# Patient Record
Sex: MALE | Race: BLACK OR AFRICAN AMERICAN | NOT HISPANIC OR LATINO | URBAN - METROPOLITAN AREA
[De-identification: names, ages, dates, MRNs, and addresses within clinical notes are randomized per-mention and may not be internally consistent; named-entity substitution may affect disease eponyms.]

---

## 2023-03-28 LAB
ANION GAP IN SER/PLAS: 11 MMOL/L (ref 10–20)
APPEARANCE, URINE: CLEAR
BILIRUBIN, URINE: NEGATIVE
BLOOD, URINE: NEGATIVE
CALCIDIOL (25 OH VITAMIN D3) (NG/ML) IN SER/PLAS: 20 NG/ML
CALCIUM (MG/DL) IN SER/PLAS: 9.6 MG/DL (ref 8.6–10.3)
CARBON DIOXIDE, TOTAL (MMOL/L) IN SER/PLAS: 31 MMOL/L (ref 21–32)
CHLORIDE (MMOL/L) IN SER/PLAS: 99 MMOL/L (ref 98–107)
COLOR, URINE: YELLOW
CREATININE (MG/DL) IN SER/PLAS: 1.63 MG/DL (ref 0.5–1.3)
CREATININE (MG/DL) IN URINE: 204 MG/DL (ref 20–370)
GFR MALE: 50 ML/MIN/1.73M2
GLUCOSE (MG/DL) IN SER/PLAS: 118 MG/DL (ref 74–99)
GLUCOSE, URINE: NEGATIVE MG/DL
KETONES, URINE: NEGATIVE MG/DL
LEUKOCYTE ESTERASE, URINE: NEGATIVE
NITRITE, URINE: NEGATIVE
PARATHYRIN INTACT (PG/ML) IN SER/PLAS: 61.4 PG/ML (ref 18.5–88)
PH, URINE: 5 (ref 5–8)
PHOSPHATE (MG/DL) IN SER/PLAS: 4 MG/DL (ref 2.5–4.9)
POTASSIUM (MMOL/L) IN SER/PLAS: 3.3 MMOL/L (ref 3.5–5.3)
PROTEIN (MG/DL) IN URINE: 8 MG/DL (ref 5–25)
PROTEIN, URINE: NEGATIVE MG/DL
PROTEIN/CREATININE (MG/MG) IN URINE: 0.04 MG/MG CREAT (ref 0–0.17)
SODIUM (MMOL/L) IN SER/PLAS: 138 MMOL/L (ref 136–145)
SPECIFIC GRAVITY, URINE: 1.02 (ref 1–1.03)
UREA NITROGEN (MG/DL) IN SER/PLAS: 15 MG/DL (ref 6–23)
UROBILINOGEN, URINE: <2 MG/DL (ref 0–1.9)

## 2023-07-27 ENCOUNTER — OFFICE VISIT (OUTPATIENT)
Dept: PRIMARY CARE | Facility: CLINIC | Age: 53
End: 2023-07-27
Payer: COMMERCIAL

## 2023-07-27 VITALS
WEIGHT: 282.4 LBS | DIASTOLIC BLOOD PRESSURE: 92 MMHG | BODY MASS INDEX: 39.39 KG/M2 | OXYGEN SATURATION: 98 % | TEMPERATURE: 97.8 F | HEART RATE: 66 BPM | SYSTOLIC BLOOD PRESSURE: 148 MMHG

## 2023-07-27 DIAGNOSIS — I10 PRIMARY HYPERTENSION: ICD-10-CM

## 2023-07-27 DIAGNOSIS — M62.830 BACK MUSCLE SPASM: ICD-10-CM

## 2023-07-27 DIAGNOSIS — E11.59 TYPE 2 DIABETES MELLITUS WITH OTHER CIRCULATORY COMPLICATION, WITHOUT LONG-TERM CURRENT USE OF INSULIN (MULTI): ICD-10-CM

## 2023-07-27 DIAGNOSIS — Z00.00 ANNUAL PHYSICAL EXAM: Primary | ICD-10-CM

## 2023-07-27 PROBLEM — E11.9 DM2 (DIABETES MELLITUS, TYPE 2) (MULTI): Status: ACTIVE | Noted: 2023-07-27

## 2023-07-27 LAB
ALANINE AMINOTRANSFERASE (SGPT) (U/L) IN SER/PLAS: 28 U/L (ref 10–52)
ALBUMIN (G/DL) IN SER/PLAS: 4.1 G/DL (ref 3.4–5)
ALKALINE PHOSPHATASE (U/L) IN SER/PLAS: 66 U/L (ref 33–120)
ANION GAP IN SER/PLAS: 12 MMOL/L (ref 10–20)
ASPARTATE AMINOTRANSFERASE (SGOT) (U/L) IN SER/PLAS: 23 U/L (ref 9–39)
BILIRUBIN TOTAL (MG/DL) IN SER/PLAS: 0.9 MG/DL (ref 0–1.2)
CALCIUM (MG/DL) IN SER/PLAS: 9.5 MG/DL (ref 8.6–10.6)
CARBON DIOXIDE, TOTAL (MMOL/L) IN SER/PLAS: 30 MMOL/L (ref 21–32)
CHLORIDE (MMOL/L) IN SER/PLAS: 102 MMOL/L (ref 98–107)
CHOLESTEROL (MG/DL) IN SER/PLAS: 151 MG/DL (ref 0–199)
CHOLESTEROL IN HDL (MG/DL) IN SER/PLAS: 53.3 MG/DL
CHOLESTEROL/HDL RATIO: 2.8
CREATININE (MG/DL) IN SER/PLAS: 1.41 MG/DL (ref 0.5–1.3)
ERYTHROCYTE DISTRIBUTION WIDTH (RATIO) BY AUTOMATED COUNT: 14.9 % (ref 11.5–14.5)
ERYTHROCYTE MEAN CORPUSCULAR HEMOGLOBIN CONCENTRATION (G/DL) BY AUTOMATED: 30.8 G/DL (ref 32–36)
ERYTHROCYTE MEAN CORPUSCULAR VOLUME (FL) BY AUTOMATED COUNT: 83 FL (ref 80–100)
ERYTHROCYTES (10*6/UL) IN BLOOD BY AUTOMATED COUNT: 5.24 X10E12/L (ref 4.5–5.9)
ESTIMATED AVERAGE GLUCOSE FOR HBA1C: 151 MG/DL
GFR MALE: 59 ML/MIN/1.73M2
GLUCOSE (MG/DL) IN SER/PLAS: 104 MG/DL (ref 74–99)
HEMATOCRIT (%) IN BLOOD BY AUTOMATED COUNT: 43.5 % (ref 41–52)
HEMOGLOBIN (G/DL) IN BLOOD: 13.4 G/DL (ref 13.5–17.5)
HEMOGLOBIN A1C/HEMOGLOBIN TOTAL IN BLOOD: 6.9 %
LDL: 74 MG/DL (ref 0–99)
LEUKOCYTES (10*3/UL) IN BLOOD BY AUTOMATED COUNT: 6.1 X10E9/L (ref 4.4–11.3)
NRBC (PER 100 WBCS) BY AUTOMATED COUNT: 0 /100 WBC (ref 0–0)
PLATELETS (10*3/UL) IN BLOOD AUTOMATED COUNT: 177 X10E9/L (ref 150–450)
POTASSIUM (MMOL/L) IN SER/PLAS: 3.4 MMOL/L (ref 3.5–5.3)
PROTEIN TOTAL: 7.1 G/DL (ref 6.4–8.2)
SODIUM (MMOL/L) IN SER/PLAS: 141 MMOL/L (ref 136–145)
THYROTROPIN (MIU/L) IN SER/PLAS BY DETECTION LIMIT <= 0.05 MIU/L: 1.7 MIU/L (ref 0.44–3.98)
TRIGLYCERIDE (MG/DL) IN SER/PLAS: 118 MG/DL (ref 0–149)
UREA NITROGEN (MG/DL) IN SER/PLAS: 14 MG/DL (ref 6–23)
VLDL: 24 MG/DL (ref 0–40)

## 2023-07-27 PROCEDURE — 85027 COMPLETE CBC AUTOMATED: CPT

## 2023-07-27 PROCEDURE — 80061 LIPID PANEL: CPT

## 2023-07-27 PROCEDURE — 1036F TOBACCO NON-USER: CPT | Performed by: NURSE PRACTITIONER

## 2023-07-27 PROCEDURE — 3066F NEPHROPATHY DOC TX: CPT | Performed by: NURSE PRACTITIONER

## 2023-07-27 PROCEDURE — 83036 HEMOGLOBIN GLYCOSYLATED A1C: CPT

## 2023-07-27 PROCEDURE — 3077F SYST BP >= 140 MM HG: CPT | Performed by: NURSE PRACTITIONER

## 2023-07-27 PROCEDURE — 99396 PREV VISIT EST AGE 40-64: CPT | Performed by: NURSE PRACTITIONER

## 2023-07-27 PROCEDURE — 80053 COMPREHEN METABOLIC PANEL: CPT

## 2023-07-27 PROCEDURE — 3008F BODY MASS INDEX DOCD: CPT | Performed by: NURSE PRACTITIONER

## 2023-07-27 PROCEDURE — 3080F DIAST BP >= 90 MM HG: CPT | Performed by: NURSE PRACTITIONER

## 2023-07-27 PROCEDURE — 84443 ASSAY THYROID STIM HORMONE: CPT

## 2023-07-27 PROCEDURE — 4010F ACE/ARB THERAPY RXD/TAKEN: CPT | Performed by: NURSE PRACTITIONER

## 2023-07-27 RX ORDER — LOSARTAN POTASSIUM 50 MG/1
50 TABLET ORAL DAILY
COMMUNITY
Start: 2023-05-16 | End: 2024-02-26 | Stop reason: SDUPTHER

## 2023-07-27 RX ORDER — CYCLOBENZAPRINE HCL 10 MG
10 TABLET ORAL 3 TIMES DAILY PRN
Qty: 15 TABLET | Refills: 0 | Status: SHIPPED | OUTPATIENT
Start: 2023-07-27

## 2023-07-27 RX ORDER — ERGOCALCIFEROL 1.25 1/1
50000 CAPSULE ORAL
COMMUNITY
Start: 2023-03-29

## 2023-07-27 RX ORDER — CICLOPIROX 80 MG/ML
SOLUTION TOPICAL
COMMUNITY
Start: 2023-02-14

## 2023-07-27 RX ORDER — METFORMIN HYDROCHLORIDE 500 MG/1
500 TABLET, EXTENDED RELEASE ORAL
COMMUNITY
Start: 2023-05-25 | End: 2024-02-26 | Stop reason: SDUPTHER

## 2023-07-27 RX ORDER — ATORVASTATIN CALCIUM 20 MG/1
20 TABLET, FILM COATED ORAL NIGHTLY
COMMUNITY
Start: 2023-05-25

## 2023-07-27 RX ORDER — BECLOMETHASONE DIPROPIONATE HFA 80 UG/1
2 AEROSOL, METERED RESPIRATORY (INHALATION) 2 TIMES DAILY
COMMUNITY
Start: 2023-05-25

## 2023-07-27 RX ORDER — LISINOPRIL 5 MG/1
TABLET ORAL
COMMUNITY
Start: 2022-10-31 | End: 2023-10-17 | Stop reason: ALTCHOICE

## 2023-07-27 RX ORDER — CLOTRIMAZOLE AND BETAMETHASONE DIPROPIONATE 10; .5 MG/ML; MG/ML
LOTION TOPICAL
COMMUNITY
Start: 2023-05-25

## 2023-07-27 RX ORDER — METHOCARBAMOL 500 MG/1
TABLET, FILM COATED ORAL
COMMUNITY
Start: 2023-04-22 | End: 2023-07-27

## 2023-07-27 RX ORDER — ALBUTEROL SULFATE 90 UG/1
2 AEROSOL, METERED RESPIRATORY (INHALATION) EVERY 4 HOURS PRN
COMMUNITY
Start: 2023-05-25

## 2023-07-27 RX ORDER — CALCIUM CITRATE/VITAMIN D3 200MG-6.25
TABLET ORAL
COMMUNITY
Start: 2023-05-25

## 2023-07-27 RX ORDER — POTASSIUM CHLORIDE 20 MEQ/1
20 TABLET, EXTENDED RELEASE ORAL DAILY PRN
COMMUNITY
Start: 2023-07-09

## 2023-07-27 RX ORDER — CHLORTHALIDONE 25 MG/1
1 TABLET ORAL DAILY
COMMUNITY
Start: 2023-05-25

## 2023-07-27 ASSESSMENT — PAIN SCALES - GENERAL: PAINLEVEL: 4

## 2023-07-27 NOTE — PROGRESS NOTES
Subjective   Patient ID: Hadley Carrasco is a 53 y.o. male who presents for right side pain (Pain right below the breast).    HPI  Pleasant 52 y/o male with PMH DM2, HTN here for Annual  PCP Dr Hernadez    Last HGA1C unknown, does not follow a diabetic diet    C/O pain to his right side. Presented to the ED with same in April 21, 2023. Treated for back muscle strain.   Cxray No acute process  CT ABDOMEN/PELVIS  IMPRESSION:  1. Slight RIGHT lower lobe consolidation, possibly volume loss.  Clinical correlation for signs of infection is recommended.   2. There is no evidence of acute appendicitis.    Reports he has not had relief. Pain is sharp, right mid back, non-radiating. Worse when sitting and relaxing, does not know what make sit better. Happens daily  Denies trauma, injury. Reports he had been working out regularly prior to the pain starting, he has been mostly sedentary since.    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    HENT: Negative.     Respiratory: Negative.     Cardiovascular: Negative.    Gastrointestinal: Negative.    Genitourinary: Negative.    Musculoskeletal: HPI  Psychiatric/Behavioral: Negative.     All other systems reviewed and are negative.    .vsVisit Vitals  BP (!) 148/92 (BP Location: Left arm, Patient Position: Sitting, BP Cuff Size: Large adult)   Pulse 66   Temp 36.6 °C (97.8 °F)   Wt 128 kg (282 lb 6.4 oz)   SpO2 98%   BMI 39.39 kg/m²   Smoking Status Never   BSA 2.53 m²         Objective   Physical Exam  Physical Exam  Vitals reviewed.   Constitutional:       General:  active.   HENT:      Head: Normocephalic and atraumatic.   Eyes:      Extraocular Movements: Extraocular movements intact.      Pupils: Pupils are equal, round, and  reactive to light.   Cardiovascular:      Rate and Rhythm: Normal rate and regular rhythm.   Pulmonary:      Effort: Pulmonary effort is normal.      Breath sounds: Normal breath sounds.   Abdominal:      General: Bowel sounds are normal.   Musculoskeletal:         General: Normal range of motion.      Cervical back: Neck supple, no tenderness with deep palpation  Skin:     General: Skin is warm and dry.      Capillary Refill: Capillary refill takes less than 2 seconds.   Neurological:      General: No focal deficit present.      Mental Status:  alert.     Assessment/Plan   Problem List Items Addressed This Visit       Annual physical exam - Primary    Relevant Orders    CBC    Comprehensive Metabolic Panel    TSH with reflex to Free T4 if abnormal    Hemoglobin A1C    Lipid Panel    DM2 (diabetes mellitus, type 2) (CMS/[facility])     HGA1C  Metformin 1000mg bid  Does not follow diabetic diet         Primary hypertension     Stable taking Chlorthalidone, Lisinopril, Losartan  K+ supplement         Back muscle spasm     Stop Robaxin, not working  Daily stretching, get back into exercise         Relevant Medications    cyclobenzaprine (Flexeril) 10 mg tablet    Adult body mass index 39.0-39.9

## 2023-07-27 NOTE — PROGRESS NOTES
Mom is aware and stated that he is NOT doing better, however, there at the Dermatologist office right now.    ----- Message from Fredi Randolph MD sent at 1/24/2018 11:18 PM CST -----  His  culture grew Staph Aureus, sensitive to the antibiotic he is on. Is he doing any better?     Subjective   Patient ID: Hadley Carrasco is a 53 y.o. male who presents for right side pain (Pain right below the breast).    HPI     Review of Systems    Objective   BP (!) 148/92 (BP Location: Left arm, Patient Position: Sitting, BP Cuff Size: Large adult)   Pulse 66   Temp 36.6 °C (97.8 °F)   Wt 128 kg (282 lb 6.4 oz)   SpO2 98%   BMI 39.39 kg/m²     Physical Exam    Assessment/Plan

## 2023-07-27 NOTE — PATIENT INSTRUCTIONS
A prescription was sent to your pharmacy. Your pharmacist can answer any questions or concerns you may have or you may call the office.    Start daily stretches    *Prostate Exam  PSA at age 55 with Urology follow up if elevated    *Colonoscopy    beginning at age 45 and then every 10 years unless abnormal. Those with high risk or family history are recommended to begin at age 40 and follow up every 5 years  If you choose not to have a colonoscopy you can use a stool -DNA test such as Cologuard which will need to be repeated every 3 years. Abnormal tests will require colonoscopy.    *Hepatitis C one time lab ages 18-79  *HIV one time lab ages 15-65     NOF recommends bone density test if:  you are a man age 70 or older  you break a bone after age 50  you are a man age 50-69 with risk factors    Also consider bone density test if:  an X-ray of your spine showing a break or bone loss in your spine  back pain with a possible break in your spine  height loss of ½ inch or more within one year  total height loss of 1½ inches from your original height        IF YOU SMOKE OR HAVE EVER SMOKED  *Low dose Computed Tomography  Age 50 to 80 with a 20 year history and currently smoking or if you have quit within the last 15 years    *Abdominal Aortic Aneurysm Ultrasound  Age 65 to 75 if you have ever smoked        Immunizations  Influenza yearly beginning in October through March  Pneumococcal beginning at age 65  Zoster (Shingles) after age 50  Tetanus every 10 years if high risk

## 2023-09-15 PROBLEM — E66.9 OBESITY: Status: ACTIVE | Noted: 2017-03-03

## 2023-09-15 PROBLEM — N18.31 STAGE 3A CHRONIC KIDNEY DISEASE (MULTI): Status: ACTIVE | Noted: 2021-05-12

## 2023-09-15 PROBLEM — N52.9 ORGANIC ERECTILE DYSFUNCTION: Status: ACTIVE | Noted: 2020-06-17

## 2023-09-15 PROBLEM — R73.03 PRE-DIABETES: Status: ACTIVE | Noted: 2023-09-15

## 2023-09-15 PROBLEM — J45.20 MILD INTERMITTENT ASTHMA, UNCOMPLICATED (HHS-HCC): Status: ACTIVE | Noted: 2021-05-12

## 2023-09-15 PROBLEM — E78.00 ELEVATED SERUM CHOLESTEROL: Status: ACTIVE | Noted: 2023-09-15

## 2023-09-15 PROBLEM — E55.9 VITAMIN D DEFICIENCY: Status: ACTIVE | Noted: 2023-09-15

## 2023-09-15 PROBLEM — G47.33 OSA ON CPAP: Status: ACTIVE | Noted: 2019-10-08

## 2023-09-15 PROBLEM — K43.9 VENTRAL HERNIA: Status: ACTIVE | Noted: 2021-05-14

## 2023-09-15 PROBLEM — R07.81 RIB PAIN ON RIGHT SIDE: Status: ACTIVE | Noted: 2023-09-15

## 2023-09-15 PROBLEM — K42.9 UMBILICAL HERNIA WITHOUT OBSTRUCTION OR GANGRENE: Status: ACTIVE | Noted: 2021-05-12

## 2023-09-15 PROBLEM — E11.9 TYPE 2 DIABETES MELLITUS WITHOUT COMPLICATION, WITHOUT LONG-TERM CURRENT USE OF INSULIN (MULTI): Status: ACTIVE | Noted: 2021-05-11

## 2023-09-15 PROBLEM — E78.49 OTHER HYPERLIPIDEMIA: Status: ACTIVE | Noted: 2019-10-08

## 2023-09-15 PROBLEM — E87.6 HYPOKALEMIA: Status: ACTIVE | Noted: 2023-09-15

## 2023-09-15 PROBLEM — I10 HYPERTENSION: Status: ACTIVE | Noted: 2019-10-08

## 2023-09-15 PROBLEM — R10.11 RUQ PAIN: Status: ACTIVE | Noted: 2023-09-15

## 2023-09-15 RX ORDER — LISINOPRIL 2.5 MG/1
2.5 TABLET ORAL DAILY
COMMUNITY
Start: 2020-01-23 | End: 2023-10-17 | Stop reason: ALTCHOICE

## 2023-09-15 RX ORDER — SILDENAFIL 100 MG/1
100 TABLET, FILM COATED ORAL DAILY PRN
COMMUNITY
Start: 2022-12-06

## 2023-10-17 ENCOUNTER — OFFICE VISIT (OUTPATIENT)
Dept: NEPHROLOGY | Facility: CLINIC | Age: 53
End: 2023-10-17
Payer: COMMERCIAL

## 2023-10-17 VITALS
DIASTOLIC BLOOD PRESSURE: 83 MMHG | BODY MASS INDEX: 39.9 KG/M2 | WEIGHT: 285 LBS | SYSTOLIC BLOOD PRESSURE: 132 MMHG | HEIGHT: 71 IN | HEART RATE: 72 BPM

## 2023-10-17 DIAGNOSIS — I10 ESSENTIAL HYPERTENSION: ICD-10-CM

## 2023-10-17 DIAGNOSIS — N18.31 STAGE 3A CHRONIC KIDNEY DISEASE (MULTI): Primary | ICD-10-CM

## 2023-10-17 PROCEDURE — 1036F TOBACCO NON-USER: CPT | Performed by: INTERNAL MEDICINE

## 2023-10-17 PROCEDURE — 3008F BODY MASS INDEX DOCD: CPT | Performed by: INTERNAL MEDICINE

## 2023-10-17 PROCEDURE — 99213 OFFICE O/P EST LOW 20 MIN: CPT | Performed by: INTERNAL MEDICINE

## 2023-10-17 PROCEDURE — 3079F DIAST BP 80-89 MM HG: CPT | Performed by: INTERNAL MEDICINE

## 2023-10-17 PROCEDURE — 4010F ACE/ARB THERAPY RXD/TAKEN: CPT | Performed by: INTERNAL MEDICINE

## 2023-10-17 PROCEDURE — 3075F SYST BP GE 130 - 139MM HG: CPT | Performed by: INTERNAL MEDICINE

## 2023-10-17 PROCEDURE — 3044F HG A1C LEVEL LT 7.0%: CPT | Performed by: INTERNAL MEDICINE

## 2023-10-17 PROCEDURE — 3066F NEPHROPATHY DOC TX: CPT | Performed by: INTERNAL MEDICINE

## 2023-10-17 NOTE — PROGRESS NOTES
Hadley Carrasco   53 y.o.    @@  [facility]/Room: [id number]/Room/bed info not found    Subjective:   The patient is being seen for a routine clinic follow-up of chronic kidney disease. Recently, the disease has been stable. Disease complications:  No hyperkalemia, no hypocalcemia, no hyperphosphatemia, no metabolic acidosis, no coagulopathy, no uremic encephalopathy, no neuropathy and no renal osteodystrophy. The patient is currently asymptomatic. No associated symptoms are reported.       Meds:   Current Outpatient Medications   Medication Sig Dispense Refill    atorvastatin (Lipitor) 20 mg tablet Take 1 tablet (20 mg) by mouth once daily at bedtime.      chlorthalidone (Hygroton) 25 mg tablet Take 1 tablet (25 mg) by mouth once daily.      ciclopirox (Penlac) 8 % solution Brush on toenails once daily for 48 weeks      clotrimazole-betamethasone (Lotrisone) lotion       cyclobenzaprine (Flexeril) 10 mg tablet Take 1 tablet (10 mg) by mouth 3 times a day as needed for muscle spasms for up to 15 doses. 15 tablet 0    lisinopril 2.5 mg tablet Take 1 tablet (2.5 mg) by mouth once daily.      lisinopril 5 mg tablet       losartan (Cozaar) 50 mg tablet Take 1 tablet (50 mg) by mouth once daily.      metFORMIN XR (Glucophage-XR) 500 mg 24 hr tablet Take 1 tablet (500 mg) by mouth 2 times a day with meals.      potassium chloride CR (Klor-Con M20) 20 mEq ER tablet Take 1 tablet (20 mEq) by mouth once daily as needed (for muscle cramps only).      Qvar RediHaler 80 mcg/actuation inhaler Inhale 2 Inhalations 2 times a day.      sildenafil (Viagra) 100 mg tablet Take 1 tablet (100 mg) by mouth once daily as needed (Take 30-60 minutes before sexual activity.).      True Metrix Glucose Test Strip strip       Ventolin HFA 90 mcg/actuation inhaler Inhale 2 puffs every 4 hours if needed (cough).      Vitamin D2 1,250 mcg (50,000 unit) capsule Take 1 capsule (50,000 Units) by mouth every 14 (fourteen) days.       No current  facility-administered medications for this visit.          ROS:  The patient is awake and oriented. No dizziness or lightheadedness. No chills and no fever. No headaches. No nausea and no vomiting. No shortness of breath. No cough. No sputum. No chest pain. No chest tightness. No abdominal pain. No diarrhea and no constipation. No hematemesis or hemoptysis. No hematuria. No rectal bleeding. No melena. No epistaxis. No urinary symptoms. No flank pain. No leg edema. No leg pain. No weakness. No itching. Overall, the rest of the review of systems is also negative.  12 point review of systems otherwise negative as stated in HPI.        Physical Examination:        Vitals:    10/17/23 1607   BP: 132/83   Pulse: 72     General: The patient is awake, oriented, and is not in any distress.  Head and Neck: Normocephalic. No periorbital edema.  Eyes: non-icteric  Respiratory: Symmetric air entry. Symmetric chest expansion.No respiratory distress.  Cardiovascular: Symmetric peripheral pulses.  Skin: No maculopapular rash.  Abdomen: soft, nt/nd  Musculoskeletal: No peripheral edema in both left and right upper extremities.  No edema in either left or right lower extremities.  Neuro Exam: Speech is fluent. Moves extremities.    Imaging:  === 03/30/23 ===    US RENAL COMPLETE    - Impression -  Unremarkable renal ultrasound.       Blood Labs:  No results found for this or any previous visit (from the past 24 hour(s)).   Lab Results   Component Value Date    PTH 61.4 03/28/2023    PROTUR NEGATIVE 04/22/2023    PROTUR 8 03/28/2023    PHOS 4.0 03/28/2023      Lab Results   Component Value Date    GLUCOSE 104 (H) 07/27/2023    CALCIUM 9.5 07/27/2023     07/27/2023    K 3.4 (L) 07/27/2023    CO2 30 07/27/2023     07/27/2023    BUN 14 07/27/2023    CREATININE 1.41 (H) 07/27/2023         Assessment and Plan:  1. Chronic kidney disease stage III. Last creatinine level from July 2023 is 1.41.  I asked for basic metabolic panel  to be done today.    2. Hypertension. His blood pressure is  just minimally higher than the goal.  He checks his blood pressure at home and constantly his systolic blood pressure is below 130.  Continue the current medications.    3. Vitamin D deficiency.  He took a course of ergocalciferol.  25-hydroxy vitamin D level will be checked.      I will see him in about 6-month for follow-up             Shaun Jacobson MD

## 2023-10-26 ENCOUNTER — OFFICE VISIT (OUTPATIENT)
Dept: PRIMARY CARE | Facility: CLINIC | Age: 53
End: 2023-10-26
Payer: COMMERCIAL

## 2023-10-26 VITALS
BODY MASS INDEX: 39.89 KG/M2 | TEMPERATURE: 97 F | DIASTOLIC BLOOD PRESSURE: 72 MMHG | SYSTOLIC BLOOD PRESSURE: 130 MMHG | WEIGHT: 286 LBS | HEART RATE: 67 BPM | OXYGEN SATURATION: 95 %

## 2023-10-26 DIAGNOSIS — E11.9 TYPE 2 DIABETES MELLITUS WITHOUT COMPLICATION, WITHOUT LONG-TERM CURRENT USE OF INSULIN (MULTI): Primary | ICD-10-CM

## 2023-10-26 DIAGNOSIS — M62.830 BACK MUSCLE SPASM: ICD-10-CM

## 2023-10-26 DIAGNOSIS — R07.81 RIB PAIN ON RIGHT SIDE: ICD-10-CM

## 2023-10-26 LAB — POC HEMOGLOBIN A1C: 6.9 % (ref 4.2–6.5)

## 2023-10-26 PROCEDURE — 3078F DIAST BP <80 MM HG: CPT | Performed by: NURSE PRACTITIONER

## 2023-10-26 PROCEDURE — 3044F HG A1C LEVEL LT 7.0%: CPT | Performed by: NURSE PRACTITIONER

## 2023-10-26 PROCEDURE — 1036F TOBACCO NON-USER: CPT | Performed by: NURSE PRACTITIONER

## 2023-10-26 PROCEDURE — 4010F ACE/ARB THERAPY RXD/TAKEN: CPT | Performed by: NURSE PRACTITIONER

## 2023-10-26 PROCEDURE — 3008F BODY MASS INDEX DOCD: CPT | Performed by: NURSE PRACTITIONER

## 2023-10-26 PROCEDURE — 83036 HEMOGLOBIN GLYCOSYLATED A1C: CPT | Performed by: NURSE PRACTITIONER

## 2023-10-26 PROCEDURE — 3075F SYST BP GE 130 - 139MM HG: CPT | Performed by: NURSE PRACTITIONER

## 2023-10-26 PROCEDURE — 99214 OFFICE O/P EST MOD 30 MIN: CPT | Performed by: NURSE PRACTITIONER

## 2023-10-26 PROCEDURE — 3066F NEPHROPATHY DOC TX: CPT | Performed by: NURSE PRACTITIONER

## 2023-10-26 ASSESSMENT — ENCOUNTER SYMPTOMS: DEPRESSION: 0

## 2023-10-26 ASSESSMENT — PAIN SCALES - GENERAL: PAINLEVEL: 5

## 2023-10-26 NOTE — PROGRESS NOTES
Subjective   Patient ID: Hadley Carrasco is a 53 y.o. male who presents for 3 month follow up (Right side pain and discuss dosage increase in meds).    HPI   Pleasant established here for follow up  Right sided pain-ribs, tender with palpation, constant ache, does not know what makes it better or worse. Non-radiating, back involvement at prior visit, Chronic, has had same for several months, prior  workups negative, At last visit prescribed Flexeril with no change    DM2- Last A1C 6.9, follows diabetic diet, Metformin bid    CKD 3a- follows with Dr Jacobson    Review of Systems  Review of Systems   Constitutional: Negative.    Respiratory: Negative.     Cardiovascular: Negative.    Gastrointestinal: Negative.    Musculoskeletal: HPI   All other systems reviewed and are negative.    Objective   /72 (BP Location: Left arm, Patient Position: Sitting)   Pulse 67   Temp 36.1 °C (97 °F)   Wt 130 kg (286 lb)   SpO2 95%   BMI 39.89 kg/m²     Physical Exam  Vitals reviewed.   Cardiovascular:      Rate and Rhythm: Normal rate and regular rhythm.   Pulmonary:      Effort: Pulmonary effort is normal.      Breath sounds: Normal breath sounds.   Chest:      Chest wall: Tenderness present.      Comments: Right side rib cage, tender to palpation, no crepitus, heat/redness/swelling appreciated  Musculoskeletal:      Cervical back: Neck supple.   Neurological:      Mental Status: He is alert.   Psychiatric:         Mood and Affect: Mood normal.         Assessment/Plan   Problem List Items Addressed This Visit             ICD-10-CM    Type 2 diabetes mellitus without complication, without long-term current use of insulin (CMS/[facility]) - Primary E11.9    Relevant Orders    POCT glycosylated hemoglobin (Hb A1C) manually resulted (Completed)    Back muscle spasm M62.830    Relevant Orders    XR lumbar spine 2-3 views    Rib pain on right side R07.81     Not resolved with muscle relaxant  Topicals         Relevant Orders    XR lumbar  spine 2-3 views    XR chest 2 views

## 2023-10-27 ENCOUNTER — HOSPITAL ENCOUNTER (OUTPATIENT)
Dept: RADIOLOGY | Facility: HOSPITAL | Age: 53
Discharge: HOME | End: 2023-10-27
Payer: COMMERCIAL

## 2023-10-27 DIAGNOSIS — R07.81 RIB PAIN ON RIGHT SIDE: ICD-10-CM

## 2023-10-27 DIAGNOSIS — M62.830 BACK MUSCLE SPASM: ICD-10-CM

## 2023-10-27 PROCEDURE — 71046 X-RAY EXAM CHEST 2 VIEWS: CPT | Mod: FY

## 2023-10-27 PROCEDURE — 72100 X-RAY EXAM L-S SPINE 2/3 VWS: CPT | Mod: FY

## 2023-10-27 PROCEDURE — 71046 X-RAY EXAM CHEST 2 VIEWS: CPT | Performed by: RADIOLOGY

## 2023-10-27 PROCEDURE — 72100 X-RAY EXAM L-S SPINE 2/3 VWS: CPT | Performed by: RADIOLOGY

## 2024-02-26 DIAGNOSIS — I10 PRIMARY HYPERTENSION: ICD-10-CM

## 2024-02-26 DIAGNOSIS — E11.9 TYPE 2 DIABETES MELLITUS WITHOUT COMPLICATION, WITHOUT LONG-TERM CURRENT USE OF INSULIN (MULTI): Primary | ICD-10-CM

## 2024-02-27 RX ORDER — METFORMIN HYDROCHLORIDE 500 MG/1
500 TABLET, EXTENDED RELEASE ORAL
Qty: 30 TABLET | Refills: 3 | Status: SHIPPED | OUTPATIENT
Start: 2024-02-27 | End: 2024-05-10

## 2024-02-27 RX ORDER — LOSARTAN POTASSIUM 50 MG/1
50 TABLET ORAL DAILY
Qty: 30 TABLET | Refills: 3 | Status: SHIPPED | OUTPATIENT
Start: 2024-02-27

## 2024-04-19 ENCOUNTER — LAB (OUTPATIENT)
Dept: LAB | Facility: LAB | Age: 54
End: 2024-04-19
Payer: COMMERCIAL

## 2024-04-19 DIAGNOSIS — N18.31 STAGE 3A CHRONIC KIDNEY DISEASE (MULTI): ICD-10-CM

## 2024-04-19 DIAGNOSIS — I10 ESSENTIAL HYPERTENSION: ICD-10-CM

## 2024-04-19 LAB — 25(OH)D3 SERPL-MCNC: 47 NG/ML (ref 30–100)

## 2024-04-19 PROCEDURE — 82306 VITAMIN D 25 HYDROXY: CPT

## 2024-04-19 PROCEDURE — 36415 COLL VENOUS BLD VENIPUNCTURE: CPT

## 2024-04-23 ENCOUNTER — OFFICE VISIT (OUTPATIENT)
Dept: NEPHROLOGY | Facility: CLINIC | Age: 54
End: 2024-04-23
Payer: COMMERCIAL

## 2024-04-23 VITALS
WEIGHT: 286 LBS | HEART RATE: 66 BPM | DIASTOLIC BLOOD PRESSURE: 90 MMHG | HEIGHT: 71 IN | SYSTOLIC BLOOD PRESSURE: 137 MMHG | BODY MASS INDEX: 40.04 KG/M2

## 2024-04-23 DIAGNOSIS — I10 ESSENTIAL HYPERTENSION: ICD-10-CM

## 2024-04-23 DIAGNOSIS — E08.22 DIABETES MELLITUS DUE TO UNDERLYING CONDITION WITH DIABETIC CHRONIC KIDNEY DISEASE, UNSPECIFIED CKD STAGE, UNSPECIFIED WHETHER LONG TERM INSULIN USE (MULTI): ICD-10-CM

## 2024-04-23 DIAGNOSIS — N18.31 STAGE 3A CHRONIC KIDNEY DISEASE (MULTI): Primary | ICD-10-CM

## 2024-04-23 PROCEDURE — 1036F TOBACCO NON-USER: CPT | Performed by: INTERNAL MEDICINE

## 2024-04-23 PROCEDURE — 99213 OFFICE O/P EST LOW 20 MIN: CPT | Performed by: INTERNAL MEDICINE

## 2024-04-23 PROCEDURE — 3008F BODY MASS INDEX DOCD: CPT | Performed by: INTERNAL MEDICINE

## 2024-04-23 PROCEDURE — 3075F SYST BP GE 130 - 139MM HG: CPT | Performed by: INTERNAL MEDICINE

## 2024-04-23 PROCEDURE — 4010F ACE/ARB THERAPY RXD/TAKEN: CPT | Performed by: INTERNAL MEDICINE

## 2024-04-23 PROCEDURE — 3080F DIAST BP >= 90 MM HG: CPT | Performed by: INTERNAL MEDICINE

## 2024-04-23 NOTE — PROGRESS NOTES
Hadley Danilo   53 y.o.    @@  [facility]/Room: [id number]/Room/bed info not found    Subjective:   The patient is being seen for a routine clinic follow-up of chronic kidney disease. Recently, the disease has been stable. Disease complications:  No hyperkalemia, no hypocalcemia, no hyperphosphatemia, no metabolic acidosis, no coagulopathy, no uremic encephalopathy, no neuropathy and no renal osteodystrophy. The patient is currently asymptomatic. No associated symptoms are reported.       Meds:   Current Outpatient Medications   Medication Sig Dispense Refill    atorvastatin (Lipitor) 20 mg tablet Take 1 tablet (20 mg) by mouth once daily at bedtime.      chlorthalidone (Hygroton) 25 mg tablet Take 1 tablet (25 mg) by mouth once daily.      ciclopirox (Penlac) 8 % solution Brush on toenails once daily for 48 weeks      clotrimazole-betamethasone (Lotrisone) lotion       cyclobenzaprine (Flexeril) 10 mg tablet Take 1 tablet (10 mg) by mouth 3 times a day as needed for muscle spasms for up to 15 doses. 15 tablet 0    losartan (Cozaar) 50 mg tablet Take 1 tablet (50 mg) by mouth once daily. 30 tablet 3    metFORMIN  mg 24 hr tablet Take 1 tablet (500 mg) by mouth 2 times a day with meals. 30 tablet 3    potassium chloride CR (Klor-Con M20) 20 mEq ER tablet Take 1 tablet (20 mEq) by mouth once daily as needed (for muscle cramps only).      Qvar RediHaler 80 mcg/actuation inhaler Inhale 2 Inhalations 2 times a day.      sildenafil (Viagra) 100 mg tablet Take 1 tablet (100 mg) by mouth once daily as needed (Take 30-60 minutes before sexual activity.).      True Metrix Glucose Test Strip strip       Ventolin HFA 90 mcg/actuation inhaler Inhale 2 puffs every 4 hours if needed (cough).      Vitamin D2 1,250 mcg (50,000 unit) capsule Take 1 capsule (50,000 Units) by mouth every 14 (fourteen) days.       No current facility-administered medications for this visit.          ROS:  The patient is awake and oriented. No dizziness  or lightheadedness. No chills and no fever. No headaches. No nausea and no vomiting. No shortness of breath. No cough. No sputum. No chest pain. No chest tightness. No abdominal pain. No diarrhea and no constipation. No hematemesis or hemoptysis. No hematuria. No rectal bleeding. No melena. No epistaxis. No urinary symptoms. No flank pain. No leg edema. No leg pain. No weakness. No itching. Overall, the rest of the review of systems is also negative.  12 point review of systems otherwise negative as stated in HPI.        Physical Examination:        Vitals:    04/23/24 1611   BP: 137/90   Pulse: 66     General: The patient is awake, oriented, and is not in any distress.  Head and Neck: Normocephalic. No periorbital edema.  Eyes: non-icteric  Respiratory: Symmetric air entry. Symmetric chest expansion.No respiratory distress.  Cardiovascular: Symmetric peripheral pulses.  Skin: No maculopapular rash.  Abdomen: soft, nt/nd  Musculoskeletal: No peripheral edema in both left and right upper extremities.  No edema in either left or right lower extremities.  Neuro Exam: Speech is fluent. Moves extremities.    Imaging:  === 03/30/23 ===    US RENAL COMPLETE    - Impression -  Unremarkable renal ultrasound.       Blood Labs:  No results found for this or any previous visit (from the past 24 hour(s)).   Lab Results   Component Value Date    PTH 61.4 03/28/2023    PROTUR NEGATIVE 04/22/2023    PROTUR 8 03/28/2023    PHOS 4.0 03/28/2023      Lab Results   Component Value Date    GLUCOSE 104 (H) 07/27/2023    CALCIUM 9.5 07/27/2023     07/27/2023    K 3.4 (L) 07/27/2023    CO2 30 07/27/2023     07/27/2023    BUN 14 07/27/2023    CREATININE 1.41 (H) 07/27/2023         Assessment and Plan:  1. Chronic kidney disease stage III. Last creatinine level from July 2023 is 1.41.  I asked for basic metabolic panel to be done today.  PTH and 25-hydroxy vitamin D level to be checked.     2. Hypertension. His blood pressure is   just minimally higher than the goal.  He checks his blood pressure at home and constantly his systolic blood pressure is below 130.  Continue the current medications.     3.  Beatties.  I asked for a spot urine protein to creatinine ratio.       I will see him in about 6-month for follow-up           Shaun Jacobson MD  Senior Attending Physician  Director of Onco-Nephrology Program  Division of Nephrology & Hypertension  [facility]

## 2024-04-24 ENCOUNTER — LAB (OUTPATIENT)
Dept: LAB | Facility: LAB | Age: 54
End: 2024-04-24
Payer: COMMERCIAL

## 2024-04-24 DIAGNOSIS — N18.31 STAGE 3A CHRONIC KIDNEY DISEASE (MULTI): ICD-10-CM

## 2024-04-24 DIAGNOSIS — I10 ESSENTIAL HYPERTENSION: ICD-10-CM

## 2024-04-24 DIAGNOSIS — E08.22 DIABETES MELLITUS DUE TO UNDERLYING CONDITION WITH DIABETIC CHRONIC KIDNEY DISEASE, UNSPECIFIED CKD STAGE, UNSPECIFIED WHETHER LONG TERM INSULIN USE (MULTI): ICD-10-CM

## 2024-04-24 LAB
25(OH)D3 SERPL-MCNC: 43 NG/ML (ref 30–100)
ANION GAP SERPL CALC-SCNC: 14 MMOL/L (ref 10–20)
BUN SERPL-MCNC: 16 MG/DL (ref 6–23)
CALCIUM SERPL-MCNC: 9.3 MG/DL (ref 8.6–10.3)
CHLORIDE SERPL-SCNC: 101 MMOL/L (ref 98–107)
CO2 SERPL-SCNC: 30 MMOL/L (ref 21–32)
CREAT SERPL-MCNC: 1.46 MG/DL (ref 0.5–1.3)
CREAT UR-MCNC: 255.2 MG/DL (ref 20–370)
EGFRCR SERPLBLD CKD-EPI 2021: 57 ML/MIN/1.73M*2
GLUCOSE SERPL-MCNC: 88 MG/DL (ref 74–99)
POTASSIUM SERPL-SCNC: 3.5 MMOL/L (ref 3.5–5.3)
PROT UR-ACNC: 14 MG/DL (ref 5–25)
PROT/CREAT UR: 0.05 MG/MG CREAT (ref 0–0.17)
PTH-INTACT SERPL-MCNC: 45.8 PG/ML (ref 18.5–88)
SODIUM SERPL-SCNC: 141 MMOL/L (ref 136–145)

## 2024-04-24 PROCEDURE — 83970 ASSAY OF PARATHORMONE: CPT

## 2024-04-24 PROCEDURE — 36415 COLL VENOUS BLD VENIPUNCTURE: CPT

## 2024-04-24 PROCEDURE — 84156 ASSAY OF PROTEIN URINE: CPT

## 2024-04-24 PROCEDURE — 82306 VITAMIN D 25 HYDROXY: CPT

## 2024-04-24 PROCEDURE — 80048 BASIC METABOLIC PNL TOTAL CA: CPT

## 2024-04-24 PROCEDURE — 82570 ASSAY OF URINE CREATININE: CPT

## 2024-05-10 DIAGNOSIS — E11.9 TYPE 2 DIABETES MELLITUS WITHOUT COMPLICATION, WITHOUT LONG-TERM CURRENT USE OF INSULIN (MULTI): ICD-10-CM

## 2024-05-10 RX ORDER — METFORMIN HYDROCHLORIDE 500 MG/1
500 TABLET, EXTENDED RELEASE ORAL
Qty: 30 TABLET | Refills: 3 | Status: SHIPPED | OUTPATIENT
Start: 2024-05-10

## 2024-06-13 ASSESSMENT — PROMIS GLOBAL HEALTH SCALE
CARRYOUT_PHYSICAL_ACTIVITIES: COMPLETELY
CARRYOUT_SOCIAL_ACTIVITIES: VERY GOOD
EMOTIONAL_PROBLEMS: SOMETIMES
RATE_PHYSICAL_HEALTH: GOOD
RATE_AVERAGE_PAIN: 3
RATE_GENERAL_HEALTH: GOOD
RATE_MENTAL_HEALTH: FAIR
RATE_SOCIAL_SATISFACTION: FAIR
RATE_AVERAGE_FATIGUE: MILD
RATE_QUALITY_OF_LIFE: VERY GOOD

## 2024-06-20 ENCOUNTER — APPOINTMENT (OUTPATIENT)
Dept: PRIMARY CARE | Facility: CLINIC | Age: 54
End: 2024-06-20
Payer: COMMERCIAL

## 2024-06-20 VITALS
SYSTOLIC BLOOD PRESSURE: 134 MMHG | HEART RATE: 82 BPM | BODY MASS INDEX: 40.63 KG/M2 | WEIGHT: 290.2 LBS | HEIGHT: 71 IN | DIASTOLIC BLOOD PRESSURE: 86 MMHG | OXYGEN SATURATION: 97 % | TEMPERATURE: 97.7 F

## 2024-06-20 DIAGNOSIS — N18.31 STAGE 3A CHRONIC KIDNEY DISEASE (MULTI): ICD-10-CM

## 2024-06-20 DIAGNOSIS — I10 PRIMARY HYPERTENSION: ICD-10-CM

## 2024-06-20 DIAGNOSIS — Z00.00 ANNUAL PHYSICAL EXAM: Primary | ICD-10-CM

## 2024-06-20 DIAGNOSIS — J45.20 MILD INTERMITTENT ASTHMA, UNCOMPLICATED (HHS-HCC): ICD-10-CM

## 2024-06-20 DIAGNOSIS — E11.9 TYPE 2 DIABETES MELLITUS WITHOUT COMPLICATION, WITHOUT LONG-TERM CURRENT USE OF INSULIN (MULTI): ICD-10-CM

## 2024-06-20 DIAGNOSIS — N52.9 ORGANIC ERECTILE DYSFUNCTION: ICD-10-CM

## 2024-06-20 DIAGNOSIS — M62.830 BACK MUSCLE SPASM: ICD-10-CM

## 2024-06-20 DIAGNOSIS — E78.00 ELEVATED SERUM CHOLESTEROL: ICD-10-CM

## 2024-06-20 DIAGNOSIS — E55.9 VITAMIN D DEFICIENCY: ICD-10-CM

## 2024-06-20 DIAGNOSIS — G47.33 OSA ON CPAP: ICD-10-CM

## 2024-06-20 PROBLEM — M21.6X1 OTHER ACQUIRED DEFORMITIES OF RIGHT FOOT: Status: ACTIVE | Noted: 2024-06-20

## 2024-06-20 PROBLEM — J45.909 ASTHMA (HHS-HCC): Status: ACTIVE | Noted: 2021-05-12

## 2024-06-20 PROCEDURE — 4010F ACE/ARB THERAPY RXD/TAKEN: CPT | Performed by: NURSE PRACTITIONER

## 2024-06-20 PROCEDURE — 1036F TOBACCO NON-USER: CPT | Performed by: NURSE PRACTITIONER

## 2024-06-20 PROCEDURE — 3079F DIAST BP 80-89 MM HG: CPT | Performed by: NURSE PRACTITIONER

## 2024-06-20 PROCEDURE — 3008F BODY MASS INDEX DOCD: CPT | Performed by: NURSE PRACTITIONER

## 2024-06-20 PROCEDURE — 3075F SYST BP GE 130 - 139MM HG: CPT | Performed by: NURSE PRACTITIONER

## 2024-06-20 PROCEDURE — 3061F NEG MICROALBUMINURIA REV: CPT | Performed by: NURSE PRACTITIONER

## 2024-06-20 PROCEDURE — 99396 PREV VISIT EST AGE 40-64: CPT | Performed by: NURSE PRACTITIONER

## 2024-06-20 RX ORDER — BECLOMETHASONE DIPROPIONATE HFA 80 UG/1
2 AEROSOL, METERED RESPIRATORY (INHALATION) 2 TIMES DAILY
Qty: 10.6 G | Refills: 1 | Status: SHIPPED | OUTPATIENT
Start: 2024-06-20

## 2024-06-20 RX ORDER — METFORMIN HYDROCHLORIDE 500 MG/1
500 TABLET, EXTENDED RELEASE ORAL
Qty: 30 TABLET | Refills: 3 | Status: SHIPPED | OUTPATIENT
Start: 2024-06-20

## 2024-06-20 RX ORDER — POTASSIUM CHLORIDE 20 MEQ/1
20 TABLET, EXTENDED RELEASE ORAL DAILY PRN
Qty: 30 TABLET | Refills: 3 | Status: SHIPPED | OUTPATIENT
Start: 2024-06-20 | End: 2025-06-20

## 2024-06-20 RX ORDER — ATORVASTATIN CALCIUM 20 MG/1
20 TABLET, FILM COATED ORAL NIGHTLY
Qty: 90 TABLET | Refills: 3 | Status: SHIPPED | OUTPATIENT
Start: 2024-06-20 | End: 2025-06-20

## 2024-06-20 RX ORDER — CALCIUM CITRATE/VITAMIN D3 200MG-6.25
TABLET ORAL
Status: CANCELLED | OUTPATIENT
Start: 2024-06-20

## 2024-06-20 RX ORDER — CHLORTHALIDONE 25 MG/1
25 TABLET ORAL DAILY
Qty: 30 TABLET | Refills: 11 | Status: SHIPPED | OUTPATIENT
Start: 2024-06-20 | End: 2025-06-20

## 2024-06-20 RX ORDER — SILDENAFIL 100 MG/1
100 TABLET, FILM COATED ORAL DAILY PRN
Qty: 10 TABLET | Refills: 1 | Status: SHIPPED | OUTPATIENT
Start: 2024-06-20

## 2024-06-20 RX ORDER — CYCLOBENZAPRINE HCL 10 MG
10 TABLET ORAL 3 TIMES DAILY PRN
Qty: 15 TABLET | Refills: 0 | Status: CANCELLED | OUTPATIENT
Start: 2024-06-20

## 2024-06-20 RX ORDER — LOSARTAN POTASSIUM 50 MG/1
50 TABLET ORAL DAILY
Qty: 30 TABLET | Refills: 3 | Status: SHIPPED | OUTPATIENT
Start: 2024-06-20

## 2024-06-20 RX ORDER — ERGOCALCIFEROL 1.25 1/1
50000 CAPSULE ORAL
Status: CANCELLED | OUTPATIENT
Start: 2024-06-20

## 2024-06-20 RX ORDER — ALBUTEROL SULFATE 90 UG/1
2 AEROSOL, METERED RESPIRATORY (INHALATION) EVERY 4 HOURS PRN
Qty: 18 G | Refills: 1 | Status: SHIPPED | OUTPATIENT
Start: 2024-06-20

## 2024-06-20 ASSESSMENT — ENCOUNTER SYMPTOMS
CONSTITUTIONAL NEGATIVE: 1
MUSCULOSKELETAL NEGATIVE: 1
RESPIRATORY NEGATIVE: 1
PSYCHIATRIC NEGATIVE: 1
GASTROINTESTINAL NEGATIVE: 1
EYES NEGATIVE: 1
NEUROLOGICAL NEGATIVE: 1
CARDIOVASCULAR NEGATIVE: 1

## 2024-06-20 ASSESSMENT — PAIN SCALES - GENERAL: PAINLEVEL: 0-NO PAIN

## 2024-06-20 ASSESSMENT — PATIENT HEALTH QUESTIONNAIRE - PHQ9
SUM OF ALL RESPONSES TO PHQ9 QUESTIONS 1 AND 2: 0
2. FEELING DOWN, DEPRESSED OR HOPELESS: NOT AT ALL
1. LITTLE INTEREST OR PLEASURE IN DOING THINGS: NOT AT ALL

## 2024-06-20 NOTE — PROGRESS NOTES
"Subjective   Patient ID: Hadley Carrasco is a 53 y.o. male who presents for Annual Exam (Pt is not fasting ).    HPI   Pleasant established 54 y/o male with a PMH of HTN, HLD, T2DM, obesity, vitamin d deficiency, ED, CKD stage 3, hypokalemia, asthma, & LESA who presents for Annual Exam.    Current Concerns:     HTN: Requesting increase in BP meds. BP today 134/86. Stable. Checks BP at home, SBP consistently in 120s.     CKD stage III - Follows with nephrologist, Dr. Jacobson. Last Cr 1.46 (4/24/2024).     Asthma, exercise induced - Well controlled. Endorses using inhaler 2x/week prior to exercise.    T2DM - Endorses eating too much bread. Educated on diabetic diet, handouts. Last A1C 6.9 (7/27/23). Interested in new glucometer. Advised to see what insurance covers or pharmacy recommends. Discussed seeing podiatrist yearly, referral.    Back Muscle Spasms: No complaints.     XR LUMBAR SPINE 2-3 VIEWS 10/27/23  IMPRESSION:  Mild lumbar degenerative change minimal scoliosis. No acute findings.      Single, SO  Works in Delivery to Pharmacies  3 Kids, 17 grand     Diet 3 meals/day, fruits & veg w/ lunch, cooks dinner  Caffeine 1 cup/week  Water 64 oz  Exercise gym 5 days/week, weights, no cardio  Non-smoker  Alcohol occasionally      Eye exam 2023  Dental exam 2024, q6 months   Colonoscopy 2020, follow up in 5 years (2025)    Review of Systems   Constitutional: Negative.    HENT: Negative.     Eyes: Negative.    Respiratory: Negative.     Cardiovascular: Negative.    Gastrointestinal: Negative.    Genitourinary: Negative.    Musculoskeletal: Negative.    Skin: Negative.    Neurological: Negative.    Psychiatric/Behavioral: Negative.     All other systems reviewed and are negative.      Objective   /86 (BP Location: Left arm, Patient Position: Sitting)   Pulse 82   Temp 36.5 °C (97.7 °F)   Ht 1.803 m (5' 11\")   Wt 132 kg (290 lb 3.2 oz)   SpO2 97%   BMI 40.47 kg/m²     Physical Exam  Vitals reviewed. "   Constitutional:       Appearance: Normal appearance.   HENT:      Head: Normocephalic and atraumatic.      Right Ear: Tympanic membrane and ear canal normal.      Left Ear: Tympanic membrane normal.      Nose: Nose normal. No congestion or rhinorrhea.      Mouth/Throat:      Mouth: Mucous membranes are moist.      Pharynx: Oropharynx is clear. Posterior oropharyngeal erythema present.   Eyes:      Extraocular Movements: Extraocular movements intact.      Pupils: Pupils are equal, round, and reactive to light.   Neck:      Thyroid: No thyroid mass.   Cardiovascular:      Rate and Rhythm: Normal rate and regular rhythm.      Pulses: Normal pulses.      Heart sounds: Normal heart sounds.   Pulmonary:      Effort: Pulmonary effort is normal.      Breath sounds: Normal breath sounds.   Abdominal:      General: Bowel sounds are normal. There is distension.      Palpations: There is no mass.      Tenderness: There is no abdominal tenderness.   Musculoskeletal:         General: No swelling. Normal range of motion.      Cervical back: Normal range of motion and neck supple.      Right lower leg: No edema.      Left lower leg: No edema.   Lymphadenopathy:      Cervical: No cervical adenopathy.   Skin:     General: Skin is warm and dry.      Capillary Refill: Capillary refill takes 2 to 3 seconds.   Neurological:      General: No focal deficit present.      Mental Status: He is alert. Mental status is at baseline.   Psychiatric:         Mood and Affect: Mood normal.         Behavior: Behavior normal.         Assessment/Plan   Problem List Items Addressed This Visit             ICD-10-CM    Annual physical exam - Primary Z00.00    Relevant Orders    CBC    Comprehensive Metabolic Panel    Vitamin D 25-Hydroxy,Total (for eval of Vitamin D levels)    TSH with reflex to Free T4 if abnormal    Hemoglobin A1C    Lipid Panel    Hepatitis C antibody    Referral to Podiatry    Type 2 diabetes mellitus without complication, without  long-term current use of insulin (Multi) E11.9     Last A1C 6.9 (7/27/23).  Interested in new glucometer.  Podiatry referral.  Diabetic diet handouts.         Relevant Medications    metFORMIN  mg 24 hr tablet    Other Relevant Orders    TSH with reflex to Free T4 if abnormal    Referral to Podiatry    Hypertension I10     Stable.  BP @ home consistently in 120s.         Relevant Medications    losartan (Cozaar) 50 mg tablet    chlorthalidone (Hygroton) 25 mg tablet    potassium chloride CR 20 mEq ER tablet    RESOLVED: Back muscle spasm M62.830    Mild intermittent asthma, uncomplicated ([facility]) J45.20     Exercise induced  Stable         Relevant Medications    Qvar RediHaler 80 mcg/actuation inhaler    Ventolin HFA 90 mcg/actuation inhaler    Organic erectile dysfunction N52.9    Relevant Medications    sildenafil (Viagra) 100 mg tablet    LESA on CPAP G47.33    Stage 3a chronic kidney disease (Multi) N18.31     Follows with nephrologist Dr. Jacobson.   Last Cr 1.46 (4/24/24)         Relevant Orders    CBC    Comprehensive Metabolic Panel    Elevated serum cholesterol E78.00    Relevant Medications    atorvastatin (Lipitor) 20 mg tablet    Other Relevant Orders    Lipid Panel    Vitamin D deficiency E55.9    Relevant Orders    Comprehensive Metabolic Panel

## 2024-06-20 NOTE — ASSESSMENT & PLAN NOTE
Last A1C 6.9 (7/27/23).  Interested in new glucometer.  Podiatry referral.  Diabetic diet handouts.

## 2024-06-20 NOTE — PROGRESS NOTES
I was present with the APRN student who participated in the documentation of this note. I have personally seen and re-examined the patient and performed the medical decision-making components (assessment and plan of care). I have reviewed the APRN student documentation and verified the findings in the note as written with additions or exceptions as stated in the body of this note  Onelia Chandler [facility]-BC

## 2024-06-20 NOTE — PATIENT INSTRUCTIONS
Return for Fasting Labs  Nothing to eat or drink for 10 hours. Black coffee, black tea or water is ok  Labs will be released to My Chart or if you are not connected you will be notified of abnormals only    A prescription was sent to your pharmacy. Your pharmacist can answer any questions or concerns you may have or you may call the office.    Diabetic Food Choices Include  Non starchy vegetables  Whole grain foods  Lean cuts of beef and pork, remove skin from chicken and turkey  Fish 2 to 3 times a week  Non-fat or Low fat dairy  Use liquid oil instead of solid fats  Water or unsweetened beverages    Heat Exhaustion   Prevent Heat Exhaustion which can lead to Heat Stroke by staying indoors in the a/c or in front of a fan on hot humid days  Avoid over-exerting yourself when it is hot out, save outdoor work for cooler morning or evening hours  Increase your fluids! Drink extra water, electrolyte replacements and limit caffeine and alcohol    If you suspect Heat Exhaustion:  Move to a cool place, loosen your clothes, put a cool wet cloth on your body or take a cool bath, drink water.   If your symptoms last more than 1 hour or get worse you should  seek medical attention.    Symptoms include :  Heavy sweating  Cold, pale, clammy skin  Nausea or vomiting  Tiredness, weakness, muscle cramping  Headache, Dizziness, Fainting

## 2024-06-22 ENCOUNTER — LAB (OUTPATIENT)
Dept: LAB | Facility: LAB | Age: 54
End: 2024-06-22
Payer: COMMERCIAL

## 2024-06-22 LAB
25(OH)D3 SERPL-MCNC: 54 NG/ML (ref 30–100)
ALBUMIN SERPL BCP-MCNC: 4 G/DL (ref 3.4–5)
ALP SERPL-CCNC: 67 U/L (ref 33–120)
ALT SERPL W P-5'-P-CCNC: 24 U/L (ref 10–52)
ANION GAP SERPL CALC-SCNC: 13 MMOL/L (ref 10–20)
AST SERPL W P-5'-P-CCNC: 22 U/L (ref 9–39)
BILIRUB SERPL-MCNC: 1 MG/DL (ref 0–1.2)
BUN SERPL-MCNC: 16 MG/DL (ref 6–23)
CALCIUM SERPL-MCNC: 9.4 MG/DL (ref 8.6–10.3)
CHLORIDE SERPL-SCNC: 101 MMOL/L (ref 98–107)
CHOLEST SERPL-MCNC: 138 MG/DL (ref 0–199)
CHOLESTEROL/HDL RATIO: 2.7
CO2 SERPL-SCNC: 31 MMOL/L (ref 21–32)
CREAT SERPL-MCNC: 1.63 MG/DL (ref 0.5–1.3)
EGFRCR SERPLBLD CKD-EPI 2021: 50 ML/MIN/1.73M*2
ERYTHROCYTE [DISTWIDTH] IN BLOOD BY AUTOMATED COUNT: 15.2 % (ref 11.5–14.5)
GLUCOSE SERPL-MCNC: 112 MG/DL (ref 74–99)
HCT VFR BLD AUTO: 45.4 % (ref 41–52)
HCV AB SER QL: REACTIVE
HDLC SERPL-MCNC: 50.9 MG/DL
HGB BLD-MCNC: 14.6 G/DL (ref 13.5–17.5)
LDLC SERPL CALC-MCNC: 64 MG/DL
MCH RBC QN AUTO: 25.8 PG (ref 26–34)
MCHC RBC AUTO-ENTMCNC: 32.2 G/DL (ref 32–36)
MCV RBC AUTO: 80 FL (ref 80–100)
NON HDL CHOLESTEROL: 87 MG/DL (ref 0–149)
NRBC BLD-RTO: 0 /100 WBCS (ref 0–0)
PLATELET # BLD AUTO: 212 X10*3/UL (ref 150–450)
POTASSIUM SERPL-SCNC: 3.9 MMOL/L (ref 3.5–5.3)
PROT SERPL-MCNC: 7.1 G/DL (ref 6.4–8.2)
RBC # BLD AUTO: 5.65 X10*6/UL (ref 4.5–5.9)
SODIUM SERPL-SCNC: 141 MMOL/L (ref 136–145)
TRIGL SERPL-MCNC: 117 MG/DL (ref 0–149)
TSH SERPL-ACNC: 1.9 MIU/L (ref 0.44–3.98)
VLDL: 23 MG/DL (ref 0–40)
WBC # BLD AUTO: 5.7 X10*3/UL (ref 4.4–11.3)

## 2024-06-22 PROCEDURE — 86803 HEPATITIS C AB TEST: CPT

## 2024-06-22 PROCEDURE — 83036 HEMOGLOBIN GLYCOSYLATED A1C: CPT

## 2024-06-22 PROCEDURE — 80053 COMPREHEN METABOLIC PANEL: CPT

## 2024-06-22 PROCEDURE — 82306 VITAMIN D 25 HYDROXY: CPT

## 2024-06-22 PROCEDURE — 80061 LIPID PANEL: CPT

## 2024-06-22 PROCEDURE — 87522 HEPATITIS C REVRS TRNSCRPJ: CPT

## 2024-06-22 PROCEDURE — 84443 ASSAY THYROID STIM HORMONE: CPT

## 2024-06-22 PROCEDURE — 85027 COMPLETE CBC AUTOMATED: CPT

## 2024-06-23 LAB
EST. AVERAGE GLUCOSE BLD GHB EST-MCNC: 146 MG/DL
HBA1C MFR BLD: 6.7 %
HCV RNA SERPL NAA+PROBE-ACNC: NOT DETECTED K[IU]/ML
HCV RNA SERPL NAA+PROBE-LOG IU: NORMAL {LOG_IU}/ML

## 2024-06-25 ENCOUNTER — TELEPHONE (OUTPATIENT)
Dept: PRIMARY CARE | Facility: CLINIC | Age: 54
End: 2024-06-25
Payer: COMMERCIAL

## 2024-06-25 DIAGNOSIS — E11.9 TYPE 2 DIABETES MELLITUS WITHOUT COMPLICATION, WITHOUT LONG-TERM CURRENT USE OF INSULIN (MULTI): Primary | ICD-10-CM

## 2024-06-25 DIAGNOSIS — B19.20 HEPATITIS C VIRUS INFECTION WITHOUT HEPATIC COMA, UNSPECIFIED CHRONICITY: Primary | ICD-10-CM

## 2024-06-25 RX ORDER — BLOOD-GLUCOSE,RECEIVER,CONT
EACH MISCELLANEOUS
Qty: 1 EACH | Refills: 0 | Status: SHIPPED | OUTPATIENT
Start: 2024-06-25

## 2024-06-25 RX ORDER — BLOOD-GLUCOSE SENSOR
EACH MISCELLANEOUS
Qty: 1 EACH | Refills: 11 | Status: SHIPPED | OUTPATIENT
Start: 2024-06-25

## 2024-06-25 RX ORDER — BLOOD-GLUCOSE TRANSMITTER
EACH MISCELLANEOUS
Qty: 1 EACH | Refills: 0 | Status: SHIPPED | OUTPATIENT
Start: 2024-06-25

## 2024-06-25 NOTE — TELEPHONE ENCOUNTER
Patient was in the office 6/21/24. He states you are suppose to send an Rx for a glucose machine that goes on his arm. He wants the one that goes on his arm sent to [facility] pharmacy

## 2024-07-05 ENCOUNTER — HOSPITAL ENCOUNTER (EMERGENCY)
Facility: HOSPITAL | Age: 54
Discharge: HOME | End: 2024-07-05
Payer: COMMERCIAL

## 2024-07-05 ENCOUNTER — APPOINTMENT (OUTPATIENT)
Dept: RADIOLOGY | Facility: HOSPITAL | Age: 54
End: 2024-07-05
Payer: COMMERCIAL

## 2024-07-05 VITALS
SYSTOLIC BLOOD PRESSURE: 142 MMHG | RESPIRATION RATE: 18 BRPM | BODY MASS INDEX: 39.2 KG/M2 | TEMPERATURE: 96.4 F | OXYGEN SATURATION: 96 % | HEART RATE: 55 BPM | WEIGHT: 280 LBS | DIASTOLIC BLOOD PRESSURE: 64 MMHG | HEIGHT: 71 IN

## 2024-07-05 DIAGNOSIS — M25.511 ACUTE PAIN OF RIGHT SHOULDER: Primary | ICD-10-CM

## 2024-07-05 DIAGNOSIS — M79.18 MUSCULOSKELETAL PAIN: ICD-10-CM

## 2024-07-05 PROCEDURE — 99283 EMERGENCY DEPT VISIT LOW MDM: CPT

## 2024-07-05 PROCEDURE — 73030 X-RAY EXAM OF SHOULDER: CPT | Mod: RT

## 2024-07-05 PROCEDURE — 73030 X-RAY EXAM OF SHOULDER: CPT | Mod: RIGHT SIDE | Performed by: RADIOLOGY

## 2024-07-05 RX ORDER — CYCLOBENZAPRINE HCL 10 MG
10 TABLET ORAL 3 TIMES DAILY PRN
Qty: 15 TABLET | Refills: 0 | Status: SHIPPED | OUTPATIENT
Start: 2024-07-05 | End: 2024-07-05

## 2024-07-05 RX ORDER — ACETAMINOPHEN 500 MG
1000 TABLET ORAL EVERY 6 HOURS PRN
Qty: 20 TABLET | Refills: 0 | Status: SHIPPED | OUTPATIENT
Start: 2024-07-05

## 2024-07-05 RX ORDER — CYCLOBENZAPRINE HCL 10 MG
10 TABLET ORAL 3 TIMES DAILY PRN
Qty: 15 TABLET | Refills: 0 | Status: SHIPPED | OUTPATIENT
Start: 2024-07-05

## 2024-07-05 RX ORDER — NAPROXEN 500 MG/1
500 TABLET ORAL 2 TIMES DAILY PRN
Qty: 20 TABLET | Refills: 0 | Status: SHIPPED | OUTPATIENT
Start: 2024-07-05 | End: 2024-07-05 | Stop reason: WASHOUT

## 2024-07-05 ASSESSMENT — LIFESTYLE VARIABLES
TOTAL SCORE: 0
EVER FELT BAD OR GUILTY ABOUT YOUR DRINKING: NO
HAVE PEOPLE ANNOYED YOU BY CRITICIZING YOUR DRINKING: NO
HAVE YOU EVER FELT YOU SHOULD CUT DOWN ON YOUR DRINKING: NO
EVER HAD A DRINK FIRST THING IN THE MORNING TO STEADY YOUR NERVES TO GET RID OF A HANGOVER: NO

## 2024-07-05 ASSESSMENT — COLUMBIA-SUICIDE SEVERITY RATING SCALE - C-SSRS
6. HAVE YOU EVER DONE ANYTHING, STARTED TO DO ANYTHING, OR PREPARED TO DO ANYTHING TO END YOUR LIFE?: NO
2. HAVE YOU ACTUALLY HAD ANY THOUGHTS OF KILLING YOURSELF?: NO
1. IN THE PAST MONTH, HAVE YOU WISHED YOU WERE DEAD OR WISHED YOU COULD GO TO SLEEP AND NOT WAKE UP?: NO

## 2024-07-05 NOTE — ED TRIAGE NOTES
This patient was seen and examined in triage    HPI:  Patient is a healthy nontoxic-appearing 53-year-old male with a past medical history of type 2 diabetes, hypertension, ED, stage III chronic kidney disease, obesity, vitamin D deficiency, asthma, presents to the emergency room today for complaint of right-sided shoulder discomfort.  Patient states he awoke today with diffuse right-sided shoulder pain.  Patient denies any radiation of pain up to the neck, down to the elbow, hand or fingers.  Patient denies any numbness or tingling.  Patient denies any injuries trauma or falls.  Patient denies any chest pain, shortness of breath difficulty breathing, abdominal pain, nausea, vomit, diarrhea or constipation, fever, shaking, or chills.    Focused PE:  Gen: Well-appearing, not in acute distress  Cardiovascular: Regular rate, normal rhythm, no murmur, no gallop  Respiratory: No adventitious lung sounds auscultated.  Neuro:  Alert and Oriented, speech clear and coherent  Musculoskeletal: Minimal reproducible tenderness upon palpation over the right lateral shoulder without any ecchymosis, erythema or gross deformity present, radial pulses strong and regular, hand  strong and regular, cap refill less than 3 seconds, no midline cervical spinal tenderness no crepitus, step-offs upon palpation    Plan:  X-ray of the right shoulder is ordered from triage.    For the remainder the patient's work-up and ED course, please see the main ED provider note.  We discussed need for diagnostic testing including lab studies and imaging.  We also discussed that they may be asked to wait in the waiting room while these test are pending.  They understand that if they choose to leave without having the testing completed or resulted that we cannot rule out acute life-threatening illnesses and the risks involved to lead to worsening condition, permanent disability or even death.

## 2024-07-05 NOTE — ED TRIAGE NOTES
PT BROUGHT IN VIA PRIVATE AUTO FROM HOME FOR RIGHT SHOULDER. PT STATES HE WOKE UP AND HAD PAIN AND LIMITED ROM TO RIGHT UPPER EXTREMITY. DENIES RECENT INJURY/ TRAUMA. DENIES CHEST PAIN. PT H(X) HTN.

## 2024-07-05 NOTE — ED PROVIDER NOTES
HPI   Chief Complaint   Patient presents with    Shoulder Pain       Patient is a healthy nontoxic-appearing 53-year-old male with a past medical history of type 2 diabetes, hypertension, mild intermittent asthma, obstructive sleep apnea, stage III chronic kidney disease,, Vitamin D deficiency, presents to the emergency today for complaint of right shoulder pain.  Patient states he awoke today with diffuse right-sided shoulder discomfort and wanted make sure it was not dislocated.  Patient denies radiation of pain up the neck or down the arm.  Patient denies any numbness or tingling, injuries trauma or falls, chest pain, shortness of breath difficulty breathing, abdominal pain with nausea, vomit, diarrhea or constipation, fever, shaking, or chills.  Patient is right-hand dominant.                          Jose Raul Coma Scale Score: 15                     Patient History   Past Medical History:   Diagnosis Date    Back muscle spasm 07/27/2023     History reviewed. No pertinent surgical history.  No family history on file.  Social History     Tobacco Use    Smoking status: Never    Smokeless tobacco: Never   Vaping Use    Vaping status: Never Used   Substance Use Topics    Alcohol use: Yes     Comment: occassionally    Drug use: Never       Physical Exam   ED Triage Vitals [07/05/24 1744]   Temperature Heart Rate Respirations BP   35.8 °C (96.4 °F) 55 18 142/64      Pulse Ox Temp Source Heart Rate Source Patient Position   96 % Temporal Monitor Sitting      BP Location FiO2 (%)     Right arm --       Physical Exam  Vitals and nursing note reviewed.   Constitutional:       General: He is not in acute distress.     Appearance: Normal appearance. He is not ill-appearing, toxic-appearing or diaphoretic.   HENT:      Head: Normocephalic.   Cardiovascular:      Rate and Rhythm: Normal rate and regular rhythm.      Pulses: Normal pulses.      Heart sounds: Normal heart sounds. No murmur heard.     No friction rub. No gallop.    Pulmonary:      Effort: Pulmonary effort is normal. No respiratory distress.      Breath sounds: Normal breath sounds. No stridor. No wheezing, rhonchi or rales.   Chest:      Chest wall: No tenderness.   Musculoskeletal:         General: Tenderness present. No swelling, deformity or signs of injury. Normal range of motion.      Cervical back: Normal range of motion and neck supple.      Right lower leg: No edema.      Left lower leg: No edema.      Comments: Reproducible tenderness upon palpation of the right lateral shoulder without any ecchymosis, erythema or gross deformity present, no reproducible tenderness over the clavicle, no midline cervical or thoracic spinal tenderness, crepitus, step-offs upon palpation, radial pulses strong and regular, cap refills less than 3 seconds, hand grasp strong and regular, able to flex extend the bicep and tricep without any difficulty.   Skin:     General: Skin is warm.      Capillary Refill: Capillary refill takes less than 2 seconds.      Coloration: Skin is not jaundiced or pale.      Findings: No bruising, erythema, lesion or rash.   Neurological:      General: No focal deficit present.      Mental Status: He is alert and oriented to person, place, and time.         ED Course & MDM   ED Course as of 07/05/24 1842 Fri Jul 05, 2024 1841 X-ray of the right shoulder reveals  IMPRESSION:  Degenerative changes without acute osseous abnormality.   [EC]      ED Course User Index  [EC] GEOFFREY Dill-CNP         Diagnoses as of 07/05/24 1842   Acute pain of right shoulder   Musculoskeletal pain       Medical Decision Making  Given patient's complaint presentation a thorough exam was performed.  On exam patient has Reproducible tenderness upon palpation of the right lateral shoulder without any ecchymosis, erythema or gross deformity present, no reproducible tenderness over the clavicle, no midline cervical or thoracic spinal tenderness, crepitus, step-offs upon  palpation, radial pulses strong and regular, cap refills less than 3 seconds, hand grasp strong and regular, able to flex extend the bicep and tricep without any difficulty.  Remains hemodynamically stable during emergency evaluation, is afebrile, denies any chest pain, shortness of breath difficulty breathing, no adventitious lung sounds auscultated, speaking complete no distress, cardiac sounds auscultated are regular, I have low suspicion for ACS, pulm embolism, aortic aneurysm, vascular compromise, flexor extensor tendon injury, acute osseous normality given lack of injury.  X-rays ordered of the right shoulder interpreted radiologist reveals degenerative changes without acute osseous abnormality.  Given patient does have reproducible pain is worse with movement I suspect patient is experiencing muscle skeletal pain.  Patient received prescription for Flexeril and Tylenol.  I encouraged monitoring symptoms, if they become worse return to emergency room for further evaluation, always follow primary care provider as needed.  Patient was agreeable with this plan discharged home in stable condition.    THOMAS Dill     Portions of this note were generated using digital voice recognition software, and may contain grammatical errors      Procedure  Procedures     THOMAS Dill  07/05/24 0242

## 2024-07-10 DIAGNOSIS — E11.9 TYPE 2 DIABETES MELLITUS WITHOUT COMPLICATION, WITHOUT LONG-TERM CURRENT USE OF INSULIN (MULTI): ICD-10-CM

## 2024-07-12 RX ORDER — METFORMIN HYDROCHLORIDE 500 MG/1
500 TABLET, EXTENDED RELEASE ORAL
Qty: 60 TABLET | Refills: 3 | Status: SHIPPED | OUTPATIENT
Start: 2024-07-12

## 2024-07-15 ENCOUNTER — APPOINTMENT (OUTPATIENT)
Dept: PRIMARY CARE | Facility: CLINIC | Age: 54
End: 2024-07-15
Payer: COMMERCIAL

## 2024-07-15 VITALS
DIASTOLIC BLOOD PRESSURE: 82 MMHG | WEIGHT: 287 LBS | TEMPERATURE: 97.1 F | OXYGEN SATURATION: 97 % | BODY MASS INDEX: 40.03 KG/M2 | HEART RATE: 67 BPM | SYSTOLIC BLOOD PRESSURE: 124 MMHG

## 2024-07-15 DIAGNOSIS — I10 PRIMARY HYPERTENSION: ICD-10-CM

## 2024-07-15 DIAGNOSIS — E11.9 TYPE 2 DIABETES MELLITUS WITHOUT COMPLICATION, WITHOUT LONG-TERM CURRENT USE OF INSULIN (MULTI): ICD-10-CM

## 2024-07-15 DIAGNOSIS — N18.31 STAGE 3A CHRONIC KIDNEY DISEASE (MULTI): ICD-10-CM

## 2024-07-15 DIAGNOSIS — Z71.3 WEIGHT LOSS COUNSELING, ENCOUNTER FOR: ICD-10-CM

## 2024-07-15 DIAGNOSIS — E55.9 VITAMIN D DEFICIENCY: Primary | ICD-10-CM

## 2024-07-15 PROCEDURE — 3008F BODY MASS INDEX DOCD: CPT | Performed by: NURSE PRACTITIONER

## 2024-07-15 PROCEDURE — 4010F ACE/ARB THERAPY RXD/TAKEN: CPT | Performed by: NURSE PRACTITIONER

## 2024-07-15 PROCEDURE — 99214 OFFICE O/P EST MOD 30 MIN: CPT | Performed by: NURSE PRACTITIONER

## 2024-07-15 PROCEDURE — 3061F NEG MICROALBUMINURIA REV: CPT | Performed by: NURSE PRACTITIONER

## 2024-07-15 PROCEDURE — 3048F LDL-C <100 MG/DL: CPT | Performed by: NURSE PRACTITIONER

## 2024-07-15 PROCEDURE — 3044F HG A1C LEVEL LT 7.0%: CPT | Performed by: NURSE PRACTITIONER

## 2024-07-15 PROCEDURE — 3074F SYST BP LT 130 MM HG: CPT | Performed by: NURSE PRACTITIONER

## 2024-07-15 PROCEDURE — 3079F DIAST BP 80-89 MM HG: CPT | Performed by: NURSE PRACTITIONER

## 2024-07-15 RX ORDER — ERGOCALCIFEROL 1.25 1/1
1 CAPSULE ORAL
Qty: 2 CAPSULE | Refills: 0 | Status: SHIPPED | OUTPATIENT
Start: 2024-07-15 | End: 2024-08-14

## 2024-07-15 RX ORDER — BLOOD PRESSURE TEST KIT-MEDIUM
1 KIT MISCELLANEOUS DAILY
Qty: 1 KIT | Refills: 0 | Status: SHIPPED | OUTPATIENT
Start: 2024-07-15

## 2024-07-15 RX ORDER — DAPAGLIFLOZIN 10 MG/1
10 TABLET, FILM COATED ORAL DAILY
Qty: 21 TABLET | Refills: 0 | Status: SHIPPED | COMMUNITY
Start: 2024-07-15 | End: 2024-08-05

## 2024-07-15 ASSESSMENT — PATIENT HEALTH QUESTIONNAIRE - PHQ9
2. FEELING DOWN, DEPRESSED OR HOPELESS: NOT AT ALL
SUM OF ALL RESPONSES TO PHQ9 QUESTIONS 1 AND 2: 0
1. LITTLE INTEREST OR PLEASURE IN DOING THINGS: NOT AT ALL

## 2024-07-15 ASSESSMENT — PAIN SCALES - GENERAL: PAINLEVEL: 0-NO PAIN

## 2024-07-15 ASSESSMENT — ENCOUNTER SYMPTOMS: DEPRESSION: 0

## 2024-07-15 NOTE — PROGRESS NOTES
Subjective   Patient ID: Hadley Carrasco is a 54 y.o. male who presents for New Med Request (Discuss meds for wt loss, needs new bp machine ).    HPI   Pleasant established 54 y/o male with a PMH of HTN, HLD, T2DM, obesity, vitamin d deficiency, ED, CKD stage 3, hypokalemia, asthma, & LESA interested in changing medication to better manage his diabetes and to lose some weight  Currently Metformin  mg bid, HGA1C 6.9, tries to follow diabetic diet, BMI currently 40.03. Working on getting new Dexcom  Denies medullary thyroid cancer or MEN2 personal or family history  Discussed Ozempic, samples provided, discussed administration, possibly side effects, encouraged to review  insert    Podiatry referral provided for diabetic foot check, heel spurs. Has tried inserts but not resolving issue    HTN- pressures good today, checks daily at home, needs new machine, noted his will only work once per day, uncertain if readings are accurate    CKD 3a- Follows with Nephrology. Provided Farxiga samples today, stopping Metformin and starting GLP1         Review of Systems  Review of Systems   Constitutional: Negative.    Respiratory: Negative.     Cardiovascular: Negative.    Gastrointestinal: Negative.    Musculoskeletal: Negative.    Psychiatric/Behavioral: Negative.     All other systems reviewed and are negative.    Objective   /82 (BP Location: Left arm, Patient Position: Sitting)   Pulse 67   Temp 36.2 °C (97.1 °F)   Wt 130 kg (287 lb)   SpO2 97%   BMI 40.03 kg/m²     Physical Exam  Physical Exam  Vitals reviewed.   Constitutional:       General: She is active.   HENT:      Head: Normocephalic and atraumatic.   Cardiovascular:      Rate and Rhythm: Normal rate and regular rhythm.   Pulmonary:      Effort: Pulmonary effort is normal.      Breath sounds: Normal breath sounds.   Musculoskeletal:         General: Normal range of motion.      Cervical back: Neck supple.      General: No focal deficit  present.      Mental Status: She is alert.     Assessment/Plan   Problem List Items Addressed This Visit             ICD-10-CM    Type 2 diabetes mellitus without complication, without long-term current use of insulin (Multi) E11.9    Relevant Medications    semaglutide 0.25 mg or 0.5 mg (2 mg/3 mL) pen injector (Start on 7/21/2024)    semaglutide 0.25 mg or 0.5 mg (2 mg/3 mL) pen injector (Start on 7/21/2024)    Other Relevant Orders    Referral to Podiatry    Referral to Nutrition Services    Hypertension I10    Relevant Medications    blood pressure kit-extra large kit    semaglutide 0.25 mg or 0.5 mg (2 mg/3 mL) pen injector (Start on 7/21/2024)    Adult body mass index 39.0-39.9 Z68.39    Relevant Medications    semaglutide 0.25 mg or 0.5 mg (2 mg/3 mL) pen injector (Start on 7/21/2024)    Stage 3a chronic kidney disease (Multi) N18.31    Relevant Medications    semaglutide 0.25 mg or 0.5 mg (2 mg/3 mL) pen injector (Start on 7/21/2024)    dapagliflozin propanediol (Farxiga) 10 mg    Vitamin D deficiency - Primary E55.9    Relevant Medications    Vitamin D2 1,250 mcg (50,000 unit) capsule    Weight loss counseling, encounter for Z71.3    Relevant Medications    semaglutide 0.25 mg or 0.5 mg (2 mg/3 mL) pen injector (Start on 7/21/2024)    semaglutide 0.25 mg or 0.5 mg (2 mg/3 mL) pen injector (Start on 7/21/2024)    Other Relevant Orders    Referral to Nutrition Services

## 2024-07-16 PROBLEM — Z71.3 WEIGHT LOSS COUNSELING, ENCOUNTER FOR: Status: ACTIVE | Noted: 2024-07-16

## 2024-07-17 ENCOUNTER — TELEPHONE (OUTPATIENT)
Dept: PRIMARY CARE | Facility: CLINIC | Age: 54
End: 2024-07-17
Payer: COMMERCIAL

## 2024-07-17 NOTE — TELEPHONE ENCOUNTER
Pharmacy called in and states Rx semaglutide 0.25 mg is not covered by insurance. Trulicity is covered and in stock , if you can E-script a new order to them.

## 2024-07-19 DIAGNOSIS — E11.9 TYPE 2 DIABETES MELLITUS WITHOUT COMPLICATION, WITHOUT LONG-TERM CURRENT USE OF INSULIN (MULTI): Primary | ICD-10-CM

## 2024-07-19 RX ORDER — DULAGLUTIDE 0.75 MG/.5ML
0.75 INJECTION, SOLUTION SUBCUTANEOUS
Qty: 2 ML | Refills: 11 | Status: SHIPPED | OUTPATIENT
Start: 2024-07-21

## 2024-07-22 ENCOUNTER — OFFICE VISIT (OUTPATIENT)
Dept: GASTROENTEROLOGY | Facility: CLINIC | Age: 54
End: 2024-07-22
Payer: COMMERCIAL

## 2024-07-22 VITALS
HEIGHT: 71 IN | WEIGHT: 283 LBS | BODY MASS INDEX: 39.62 KG/M2 | SYSTOLIC BLOOD PRESSURE: 116 MMHG | RESPIRATION RATE: 20 BRPM | HEART RATE: 70 BPM | TEMPERATURE: 97.6 F | DIASTOLIC BLOOD PRESSURE: 75 MMHG

## 2024-07-22 DIAGNOSIS — K76.0 FATTY LIVER: Primary | ICD-10-CM

## 2024-07-22 PROCEDURE — 99203 OFFICE O/P NEW LOW 30 MIN: CPT | Performed by: NURSE PRACTITIONER

## 2024-07-22 PROCEDURE — 4010F ACE/ARB THERAPY RXD/TAKEN: CPT | Performed by: NURSE PRACTITIONER

## 2024-07-22 PROCEDURE — 1036F TOBACCO NON-USER: CPT | Performed by: NURSE PRACTITIONER

## 2024-07-22 PROCEDURE — 3008F BODY MASS INDEX DOCD: CPT | Performed by: NURSE PRACTITIONER

## 2024-07-22 PROCEDURE — 3061F NEG MICROALBUMINURIA REV: CPT | Performed by: NURSE PRACTITIONER

## 2024-07-22 PROCEDURE — 99213 OFFICE O/P EST LOW 20 MIN: CPT | Performed by: NURSE PRACTITIONER

## 2024-07-22 PROCEDURE — 3074F SYST BP LT 130 MM HG: CPT | Performed by: NURSE PRACTITIONER

## 2024-07-22 PROCEDURE — 3078F DIAST BP <80 MM HG: CPT | Performed by: NURSE PRACTITIONER

## 2024-07-22 PROCEDURE — 3044F HG A1C LEVEL LT 7.0%: CPT | Performed by: NURSE PRACTITIONER

## 2024-07-22 PROCEDURE — 3048F LDL-C <100 MG/DL: CPT | Performed by: NURSE PRACTITIONER

## 2024-07-22 ASSESSMENT — ENCOUNTER SYMPTOMS
BLOOD IN STOOL: 0
ABDOMINAL DISTENTION: 0
UNEXPECTED WEIGHT CHANGE: 0
TREMORS: 0
COLOR CHANGE: 0
SHORTNESS OF BREATH: 0
LIGHT-HEADEDNESS: 0
FREQUENCY: 0
NUMBNESS: 0
AGITATION: 0
WEAKNESS: 0
CHILLS: 0
NAUSEA: 0
VOICE CHANGE: 0
DIFFICULTY URINATING: 0
BRUISES/BLEEDS EASILY: 0
TROUBLE SWALLOWING: 0
DIZZINESS: 0
SLEEP DISTURBANCE: 0
PALPITATIONS: 0
APPETITE CHANGE: 0
HEADACHES: 0
CONSTIPATION: 0
WHEEZING: 0
JOINT SWELLING: 0
VOMITING: 0
HEMATURIA: 0
FEVER: 0
DYSURIA: 0
COUGH: 0
DIARRHEA: 0
CONFUSION: 0
ABDOMINAL PAIN: 0

## 2024-07-22 ASSESSMENT — PAIN SCALES - GENERAL: PAINLEVEL: 5

## 2024-07-22 NOTE — PROGRESS NOTES
"  Patient ID: Hadley Carrasco is a 54 y.o. male who presents for evaluation of liver.    HPI  54 year old referred by his PCP for \"colon test\".   He is unsure why he is here, though he was screened for Hep C which confirms exposure but he does not have detectable virus.    He does not know how be may have contracted Hep C, but explained that whenever that may have happened, he cleared it on his own.  He had a colonoscopy in 2020 and is not due again until 2030.    He has risk factors for fatty liver including obesity, DM, HTN and HLD.  Previous normal liver enzymes/function tests.  Asymptomatic.    Denies jaundice, icterus, itching, abdominal distension, edema, bleeding or confusion.    Denies fevers, chills, abdominal pain.       Review of Systems   Constitutional:  Negative for appetite change, chills, fever and unexpected weight change.   HENT:  Negative for mouth sores, nosebleeds, trouble swallowing and voice change.    Eyes:  Negative for visual disturbance.   Respiratory:  Negative for cough, shortness of breath and wheezing.    Cardiovascular:  Negative for chest pain, palpitations and leg swelling.   Gastrointestinal:  Negative for abdominal distention, abdominal pain, blood in stool, constipation, diarrhea, nausea and vomiting.   Genitourinary:  Negative for decreased urine volume, difficulty urinating, dysuria, frequency, hematuria and urgency.   Musculoskeletal:  Negative for gait problem and joint swelling.   Skin:  Negative for color change, pallor and rash.   Neurological:  Negative for dizziness, tremors, weakness, light-headedness, numbness and headaches.   Hematological:  Does not bruise/bleed easily.   Psychiatric/Behavioral:  Negative for agitation, behavioral problems, confusion and sleep disturbance.        Objective   Physical Exam  Constitutional:       General: He is awake.      Appearance: Normal appearance. He is well-developed.   HENT:      Head: Normocephalic and atraumatic.      Right " Ear: Hearing normal.      Left Ear: Hearing normal.      Nose: Nose normal.      Mouth/Throat:      Lips: Pink.      Mouth: Mucous membranes are moist.   Eyes:      General: Lids are normal.      Extraocular Movements: Extraocular movements intact.      Conjunctiva/sclera: Conjunctivae normal.      Pupils: Pupils are equal, round, and reactive to light.   Cardiovascular:      Rate and Rhythm: Normal rate and regular rhythm.      Pulses: Normal pulses.      Heart sounds: Normal heart sounds.   Pulmonary:      Effort: Pulmonary effort is normal.      Breath sounds: Normal breath sounds.   Abdominal:      General: Abdomen is flat. Bowel sounds are normal.      Palpations: Abdomen is soft.   Musculoskeletal:      Cervical back: Normal range of motion and neck supple.   Feet:      Right foot:      Skin integrity: Skin integrity normal.      Left foot:      Skin integrity: Skin integrity normal.   Skin:     General: Skin is warm.   Neurological:      General: No focal deficit present.      Mental Status: He is alert and oriented to person, place, and time.      Cranial Nerves: Cranial nerves 2-12 are intact.      Sensory: Sensation is intact.      Motor: Motor function is intact.      Coordination: Coordination is intact.      Gait: Gait is intact.   Psychiatric:         Attention and Perception: Attention and perception normal.         Mood and Affect: Mood normal.         Speech: Speech normal.         Behavior: Behavior is cooperative.         Thought Content: Thought content normal.         Cognition and Memory: Cognition normal.         Judgment: Judgment normal.         Current Outpatient Medications   Medication Sig Dispense Refill    acetaminophen (Tylenol Extra Strength) 500 mg tablet Take 2 tablets (1,000 mg) by mouth every 6 hours if needed for mild pain (1 - 3). 20 tablet 0    atorvastatin (Lipitor) 20 mg tablet Take 1 tablet (20 mg) by mouth once daily at bedtime. 90 tablet 3    blood pressure kit-extra large  kit 1 kit once daily. 1 kit 0    chlorthalidone (Hygroton) 25 mg tablet Take 1 tablet (25 mg) by mouth once daily. 30 tablet 11    ciclopirox (Penlac) 8 % solution Brush on toenails once daily for 48 weeks      clotrimazole-betamethasone (Lotrisone) lotion       cyclobenzaprine (Flexeril) 10 mg tablet Take 1 tablet (10 mg) by mouth 3 times a day as needed for muscle spasms. 15 tablet 0    dapagliflozin propanediol (Farxiga) 10 mg Take 1 tablet (10 mg) by mouth once daily for 21 days. 21 tablet 0    Dexcom G6  misc Use as instructed 1 each 0    Dexcom G6 Sensor device 1 box of 4 1 each 11    Dexcom G6 Transmitter device Use as instructed 1 each 0    dulaglutide (Trulicity) 0.75 mg/0.5 mL pen injector Inject 0.75 mg under the skin 1 (one) time per week. 2 mL 11    losartan (Cozaar) 50 mg tablet Take 1 tablet (50 mg) by mouth once daily. 30 tablet 3    metFORMIN  mg 24 hr tablet TAKE ONE TABLET BY MOUTH TWICE A DAY WITH MEALS 60 tablet 3    potassium chloride CR 20 mEq ER tablet Take 1 tablet (20 mEq) by mouth once daily as needed (for muscle cramps only). Do not crush or chew. 30 tablet 3    Qvar RediHaler 80 mcg/actuation inhaler Inhale 2 Inhalations 2 times a day. Rinse mouth with water after use to reduce aftertaste and incidence of candidiasis. Do not swallow. 10.6 g 1    sildenafil (Viagra) 100 mg tablet Take 1 tablet (100 mg) by mouth once daily as needed (Take 30-60 minutes before sexual activity.). 10 tablet 1    True Metrix Glucose Test Strip strip       Ventolin HFA 90 mcg/actuation inhaler Inhale 2 puffs every 4 hours if needed (cough). 18 g 1    Vitamin D2 1,250 mcg (50,000 unit) capsule Take 1 capsule (1,250 mcg) by mouth every 14 (fourteen) days. 2 capsule 0     No current facility-administered medications for this visit.     LABS:   Lab Results   Component Value Date    ALBUMIN 4.0 06/22/2024    BILITOT 1.0 06/22/2024    ALKPHOS 67 06/22/2024    ALT 24 06/22/2024    AST 22 06/22/2024     PROT 7.1 06/22/2024             Assessment/Plan   Problem List Items Addressed This Visit             ICD-10-CM    Fatty liver - Primary K76.0     54 year old with DM, obesity, HTN and HLD which places him at risk for fatty liver.   Discussed natural history of fatty liver including risk factors and management.  Exercise/Activity  Vigorous physical activity like brisk walking or structured gym exercises 3 times a week for 30 minutes at minimum.    Resistance training to build muscle mass which will aid in weight loss.  Swimming, water aerobics are excellent forms of exercises that are easy on joints.    Exercise for 30 minutes or more at least 3 times a week  Aim to lose 7-10% of your total body weight over 6-12 months  Diet  Avoid/Limit simple carbs including simple sugars  Avoid eating foods that are rich in sugar in excess such as high sugar content fruits (pineapple, tyrone...) and desserts, cakes and pies  Eat more good foods that are rich in good fat such as cold water fish (salmon, swordfish...) as well as avocados and peanut butter in moderation  Snack on nuts that are high in protein and good oils such as walnuts, almonds.   Eat a mediteranean diet which is rich in grilled lean meats, high protein beans and legumes and olive oil.          Will complete fibroscan to grade and stage fatty liver.  Based on these findings, will determine follow-up and treatment course, which could include new drug for MCLAUGHLIN.              Relevant Orders    Liver Elastography (Fibroscan)            THOMAS James 07/22/24 1:20 PM

## 2024-07-22 NOTE — ASSESSMENT & PLAN NOTE
54 year old with DM, obesity, HTN and HLD which places him at risk for fatty liver.   Discussed natural history of fatty liver including risk factors and management.  Exercise/Activity  Vigorous physical activity like brisk walking or structured gym exercises 3 times a week for 30 minutes at minimum.    Resistance training to build muscle mass which will aid in weight loss.  Swimming, water aerobics are excellent forms of exercises that are easy on joints.    Exercise for 30 minutes or more at least 3 times a week  Aim to lose 7-10% of your total body weight over 6-12 months  Diet  Avoid/Limit simple carbs including simple sugars  Avoid eating foods that are rich in sugar in excess such as high sugar content fruits (pineapple, tyrone...) and desserts, cakes and pies  Eat more good foods that are rich in good fat such as cold water fish (salmon, swordfish...) as well as avocados and peanut butter in moderation  Snack on nuts that are high in protein and good oils such as walnuts, almonds.   Eat a mediteranean diet which is rich in grilled lean meats, high protein beans and legumes and olive oil.          Will complete fibroscan to grade and stage fatty liver.  Based on these findings, will determine follow-up and treatment course, which could include new drug for MCLAUGHLIN.

## 2024-08-05 DIAGNOSIS — N18.31 STAGE 3A CHRONIC KIDNEY DISEASE (MULTI): ICD-10-CM

## 2024-08-05 RX ORDER — DAPAGLIFLOZIN 10 MG/1
10 TABLET, FILM COATED ORAL DAILY
Qty: 21 TABLET | Refills: 0 | Status: SHIPPED | OUTPATIENT
Start: 2024-08-05 | End: 2024-08-26

## 2024-08-08 ENCOUNTER — APPOINTMENT (OUTPATIENT)
Dept: GASTROENTEROLOGY | Facility: CLINIC | Age: 54
End: 2024-08-08
Payer: COMMERCIAL

## 2024-08-18 DIAGNOSIS — N18.31 STAGE 3A CHRONIC KIDNEY DISEASE (MULTI): ICD-10-CM

## 2024-08-19 ENCOUNTER — TELEPHONE (OUTPATIENT)
Dept: PRIMARY CARE | Facility: CLINIC | Age: 54
End: 2024-08-19
Payer: COMMERCIAL

## 2024-08-19 DIAGNOSIS — N18.31 STAGE 3A CHRONIC KIDNEY DISEASE (MULTI): ICD-10-CM

## 2024-08-19 RX ORDER — DAPAGLIFLOZIN 10 MG/1
TABLET, FILM COATED ORAL
Qty: 21 TABLET | Refills: 0 | OUTPATIENT
Start: 2024-08-19

## 2024-08-19 RX ORDER — DAPAGLIFLOZIN 5 MG/1
10 TABLET, FILM COATED ORAL DAILY
Qty: 60 TABLET | Refills: 11 | Status: SHIPPED
Start: 2024-08-19 | End: 2024-08-20 | Stop reason: SDUPTHER

## 2024-08-20 DIAGNOSIS — N18.31 STAGE 3A CHRONIC KIDNEY DISEASE (MULTI): ICD-10-CM

## 2024-08-20 RX ORDER — DAPAGLIFLOZIN 10 MG/1
10 TABLET, FILM COATED ORAL DAILY
Qty: 30 TABLET | Refills: 11 | Status: SHIPPED | OUTPATIENT
Start: 2024-08-20 | End: 2025-08-20

## 2024-08-22 ENCOUNTER — APPOINTMENT (OUTPATIENT)
Dept: PODIATRY | Facility: CLINIC | Age: 54
End: 2024-08-22
Payer: COMMERCIAL

## 2024-08-22 DIAGNOSIS — E11.9 TYPE 2 DIABETES MELLITUS WITHOUT COMPLICATION, WITHOUT LONG-TERM CURRENT USE OF INSULIN (MULTI): ICD-10-CM

## 2024-08-22 DIAGNOSIS — M76.62 ACHILLES TENDINITIS OF BOTH LOWER EXTREMITIES: ICD-10-CM

## 2024-08-22 DIAGNOSIS — M72.2 PLANTAR FASCIITIS: Primary | ICD-10-CM

## 2024-08-22 DIAGNOSIS — M76.61 ACHILLES TENDINITIS OF BOTH LOWER EXTREMITIES: ICD-10-CM

## 2024-08-22 DIAGNOSIS — B35.3 TINEA PEDIS OF BOTH FEET: ICD-10-CM

## 2024-08-22 PROCEDURE — 3048F LDL-C <100 MG/DL: CPT | Performed by: PODIATRIST

## 2024-08-22 PROCEDURE — 99203 OFFICE O/P NEW LOW 30 MIN: CPT | Performed by: PODIATRIST

## 2024-08-22 PROCEDURE — 1036F TOBACCO NON-USER: CPT | Performed by: PODIATRIST

## 2024-08-22 PROCEDURE — 3061F NEG MICROALBUMINURIA REV: CPT | Performed by: PODIATRIST

## 2024-08-22 PROCEDURE — 4010F ACE/ARB THERAPY RXD/TAKEN: CPT | Performed by: PODIATRIST

## 2024-08-22 PROCEDURE — 3044F HG A1C LEVEL LT 7.0%: CPT | Performed by: PODIATRIST

## 2024-08-22 PROCEDURE — 99213 OFFICE O/P EST LOW 20 MIN: CPT | Performed by: PODIATRIST

## 2024-08-22 RX ORDER — KETOCONAZOLE 20 MG/G
CREAM TOPICAL 2 TIMES DAILY
Qty: 60 G | Refills: 3 | Status: SHIPPED | OUTPATIENT
Start: 2024-08-22

## 2024-08-22 RX ORDER — METHYLPREDNISOLONE 4 MG/1
TABLET ORAL
Qty: 21 TABLET | Refills: 0 | Status: SHIPPED | OUTPATIENT
Start: 2024-08-22 | End: 2024-08-29

## 2024-08-22 NOTE — PROGRESS NOTES
"Chief Complaint   Patient presents with    DM Foot Care     New Patient DM foot care. Patient complains of pain in both feet. \"I have heel spurs.\"     HPI:C/O plantar and posterior heel pain B/L x years.  No trauma.  (+)post-static dyskinesia  Patient has custom inserts.  Saw another DPM, was instructed to stretch, did not do this.     PMH, PSx, Medications and allergies reviewed.  ROS negative except for what is stated in HPI.    Physical Exam  Patient alert, oriented, no acute distress  Respiratory: non labored breathing  Psychiatric: Mood and affect normal/baseline  HEENT: sclera clear    VASC: +2/4 pedal pulses B/L.  CFT brisk all digits.  Skin temperature is warm to warm proximal distal B/L.  (+)hair growth B/L.       NEURO: Vibratory intact B/L.  Light touch intact B/L.   5.07 Enderlin-Yari monofilament intact B/L.    DERM: No ecchymosis, no ulcerations noted B/L.  Erythematous scaly skin in a moccasin distribution bilaterally.  No cellulitis was noted.    MUSCULOSKEL: +5/5 muscle strength B/L.    Decreased ankle joint range of motion B/L  Pain upon palpation of the medial tubercle of the calcaneus base/L  Pain upon palpation of the Achilles insertion B/L  No palpable gaps in Achilles tendon noted B/L  No palpable bursa noted B/L  No pain with lateral compression of calcaneus B/L  Windlass mechanism is intact B/L    Lab Results   Component Value Date    HGBA1C 6.7 (H) 06/22/2024      Assessment and Plan  #1 Plantar fasciitis B/L  Discussed pathology and treatment options  Patient to stretch daily, exercises printed out  Patient to freeze a water bottle and roll his foot on it  Rx: Medrol Dosepak  Rx: Topical combination cream  Dispensed night splint  Follow-up 2-3 weeks    #2 Achilles insertion tendonitis B/L.  Treatment as above    #3 tinea pedis B/L.  Discussed pathology and treatment option  Rx: Ketoconazole cream apply twice a day x 2 weeks  Follow-up 2-3 weeks    #4  Diabetic foot  Continue to control " glucose  Avoid barefoot walking  Patient to follow-up yearly

## 2024-08-29 ENCOUNTER — APPOINTMENT (OUTPATIENT)
Dept: NUTRITION | Facility: CLINIC | Age: 54
End: 2024-08-29
Payer: COMMERCIAL

## 2024-09-12 ENCOUNTER — APPOINTMENT (OUTPATIENT)
Dept: PODIATRY | Facility: CLINIC | Age: 54
End: 2024-09-12
Payer: COMMERCIAL

## 2024-09-12 DIAGNOSIS — B35.3 TINEA PEDIS OF BOTH FEET: ICD-10-CM

## 2024-09-12 DIAGNOSIS — M76.61 ACHILLES TENDINITIS OF BOTH LOWER EXTREMITIES: ICD-10-CM

## 2024-09-12 DIAGNOSIS — E11.9 TYPE 2 DIABETES MELLITUS WITHOUT COMPLICATION, WITHOUT LONG-TERM CURRENT USE OF INSULIN (MULTI): ICD-10-CM

## 2024-09-12 DIAGNOSIS — M72.2 PLANTAR FASCIITIS: Primary | ICD-10-CM

## 2024-09-12 DIAGNOSIS — M76.62 ACHILLES TENDINITIS OF BOTH LOWER EXTREMITIES: ICD-10-CM

## 2024-09-12 PROCEDURE — 3048F LDL-C <100 MG/DL: CPT | Performed by: PODIATRIST

## 2024-09-12 PROCEDURE — 3044F HG A1C LEVEL LT 7.0%: CPT | Performed by: PODIATRIST

## 2024-09-12 PROCEDURE — 4010F ACE/ARB THERAPY RXD/TAKEN: CPT | Performed by: PODIATRIST

## 2024-09-12 PROCEDURE — 1036F TOBACCO NON-USER: CPT | Performed by: PODIATRIST

## 2024-09-12 PROCEDURE — 99213 OFFICE O/P EST LOW 20 MIN: CPT | Performed by: PODIATRIST

## 2024-09-12 PROCEDURE — 3061F NEG MICROALBUMINURIA REV: CPT | Performed by: PODIATRIST

## 2024-09-12 RX ORDER — KETOCONAZOLE 20 MG/G
CREAM TOPICAL 2 TIMES DAILY
Qty: 60 G | Refills: 3 | Status: SHIPPED | OUTPATIENT
Start: 2024-09-12

## 2024-09-12 NOTE — PROGRESS NOTES
Chief Complaint   Patient presents with    Tinea Pedis     Patient is here for a follow up on Tinea Pedis.     HPI: Follow-up tinea pedis.  Patient is been using ketoconazole cream.  Feels the skin quality has significantly improved.  Follow-up Planter fasciitis and Achilles tendinitis.  Patient is using the night splint.  Topical combination cream helps.  Also feels improved with this issue.    Physical Exam  Patient alert, oriented, no acute distress  Respiratory: non labored breathing  Psychiatric: Mood and affect normal/baseline  HEENT: sclera clear    VASC: +2/4 pedal pulses B/L.  CFT brisk all digits.  Skin temperature is warm to warm proximal distal B/L.  (+)hair growth B/L.       NEURO: Vibratory intact B/L.  Light touch intact B/L.   5.07 Wray-Yari monofilament intact B/L.    DERM: No ecchymosis, no ulcerations noted B/L.  Erythematous scaly skin in a moccasin distribution bilaterally has improved.  Mild amount of scaliness remains in the webspaces.  Preulcerative hyperkeratotic painful lesions underlying the fifth metatarsal heads and the second metatarsal heads bilateral foot.  No cellulitis was noted.    MUSCULOSKEL: +5/5 muscle strength B/L.    Decreased ankle joint range of motion B/L  Mild pain upon palpation of the medial tubercle of the calcaneus base/L  Mild pain upon palpation of the Achilles insertion B/L  No palpable gaps in Achilles tendon noted B/L  No palpable bursa noted B/L  No pain with lateral compression of calcaneus B/L  Windlass mechanism is intact B/L    Lab Results   Component Value Date    HGBA1C 6.7 (H) 06/22/2024      Assessment and Plan  #1 Plantar fasciitis B/L  Continue with current treatment plan  Patient declined cortisone injections  Follow-up as needed    #2 Achilles insertion tendonitis B/L.  Treatment as above    #3 tinea pedis B/L.  Continue with ketoconazole cream until resolved  Follow-up as needed     #4 calluses bilateral foot  Paring of all hyperkeratotic  tissue with a 15 blade without complications  Follow-up as needed

## 2024-09-16 DIAGNOSIS — I10 PRIMARY HYPERTENSION: ICD-10-CM

## 2024-09-16 RX ORDER — CHLORTHALIDONE 25 MG/1
25 TABLET ORAL DAILY
Qty: 90 TABLET | Refills: 1 | Status: SHIPPED | OUTPATIENT
Start: 2024-09-16

## 2024-09-23 ENCOUNTER — CLINICAL SUPPORT (OUTPATIENT)
Dept: GASTROENTEROLOGY | Facility: CLINIC | Age: 54
End: 2024-09-23
Payer: COMMERCIAL

## 2024-09-23 DIAGNOSIS — K76.0 FATTY LIVER: ICD-10-CM

## 2024-09-23 PROCEDURE — 91200 LIVER ELASTOGRAPHY: CPT | Performed by: STUDENT IN AN ORGANIZED HEALTH CARE EDUCATION/TRAINING PROGRAM

## 2024-10-03 ENCOUNTER — OFFICE VISIT (OUTPATIENT)
Dept: PODIATRY | Facility: CLINIC | Age: 54
End: 2024-10-03
Payer: COMMERCIAL

## 2024-10-03 ENCOUNTER — NUTRITION (OUTPATIENT)
Dept: NUTRITION | Facility: CLINIC | Age: 54
End: 2024-10-03
Payer: COMMERCIAL

## 2024-10-03 VITALS — BODY MASS INDEX: 38.42 KG/M2 | HEIGHT: 71 IN | WEIGHT: 274.4 LBS

## 2024-10-03 DIAGNOSIS — E11.9 TYPE 2 DIABETES MELLITUS WITHOUT COMPLICATION, WITHOUT LONG-TERM CURRENT USE OF INSULIN (MULTI): ICD-10-CM

## 2024-10-03 DIAGNOSIS — K76.0 FATTY LIVER: Primary | ICD-10-CM

## 2024-10-03 DIAGNOSIS — L29.9 PRURITUS OF SKIN: ICD-10-CM

## 2024-10-03 DIAGNOSIS — L30.9 DERMATITIS OF BOTH FEET: Primary | ICD-10-CM

## 2024-10-03 DIAGNOSIS — Z71.3 WEIGHT LOSS COUNSELING, ENCOUNTER FOR: ICD-10-CM

## 2024-10-03 DIAGNOSIS — E66.812 CLASS 2 OBESITY WITH BODY MASS INDEX (BMI) OF 39.0 TO 39.9 IN ADULT, UNSPECIFIED OBESITY TYPE, UNSPECIFIED WHETHER SERIOUS COMORBIDITY PRESENT: ICD-10-CM

## 2024-10-03 PROCEDURE — 4010F ACE/ARB THERAPY RXD/TAKEN: CPT | Performed by: PODIATRIST

## 2024-10-03 PROCEDURE — 3044F HG A1C LEVEL LT 7.0%: CPT | Performed by: PODIATRIST

## 2024-10-03 PROCEDURE — 3061F NEG MICROALBUMINURIA REV: CPT | Performed by: PODIATRIST

## 2024-10-03 PROCEDURE — 99213 OFFICE O/P EST LOW 20 MIN: CPT | Performed by: PODIATRIST

## 2024-10-03 PROCEDURE — 3048F LDL-C <100 MG/DL: CPT | Performed by: PODIATRIST

## 2024-10-03 PROCEDURE — 1036F TOBACCO NON-USER: CPT | Performed by: PODIATRIST

## 2024-10-03 PROCEDURE — 97802 MEDICAL NUTRITION INDIV IN: CPT | Performed by: DIETITIAN, REGISTERED

## 2024-10-03 RX ORDER — TRIAMCINOLONE ACETONIDE 0.25 MG/G
OINTMENT TOPICAL 2 TIMES DAILY
Qty: 80 G | Refills: 1 | Status: SHIPPED | OUTPATIENT
Start: 2024-10-03

## 2024-10-03 NOTE — PROGRESS NOTES
"Nutrition Assessment     Reason for Visit:  Hadley Carrasco is a 54 y.o. male who presents for Obesity, DM2    Anthropometrics:  Anthropometrics  Height: 180.3 cm (5' 11\")  Weight: 124 kg (274 lb 6.4 oz)  BMI (Calculated): 38.29    Significant Past Medical Hx:  LESA, HLD,  CKD3    Biochemical/Laboratory Data:  Lab Results   Component Value Date    HGBA1C 6.7 (H) 06/22/2024    HGBA1C 6.9 (A) 10/26/2023    CHOL 138 06/22/2024    LDLF 74 07/27/2023    TRIG 117 06/22/2024      Pertinent Medications:  Farxiga, Trulicity, Metformin    Food And Nutrient Intake:  Food and Nutrient History  Food and Nutrient History: Met w/ pt to obtain diet hx and instruct on diet strategies for weight loss and DM2.  Pt reports he recently has changed eating habits and has lost some weight.  Diet hx indicates consistent meal times, imbalance of food groups at meals, less than recommended  intake of vegetables, large portions (expecially carbs) at meals.  Fluid Intake: water  Food Allergy: none  Food Intolerance: none     Food Intake  Meal 1: 3C Raisin Bran, Milk OR oatmeal - flavored pkt  Meal 2: nuts, fruit, protein bar - was doing a bologna sandwich  Meal 3: chicken, rice  OR PBJ x 2  Snacks: chips, fruit    Food Preparation  Cooking: Spouse/Significant Other  Grocery Shopping: Spouse/Significant Other  Dining Out: 1 to 3 times a month  Other: lives with girlfriend who does most of the cooking ans shopping  Grocery Shopping Schedule: q 2 weeks girlfriend does shopping  Convenience/Processed Foods: Processed/Frozen Convenience Meals and Processed Salty Snacks   2-3 times per week  Cooking Skills/Barriers: None reported  Meal Preparation Habits: Has cooking skills(girlfriend)  Eating Out Type: Denies/Unknown   General Food Category Consumption Habits: Typically 2 or more servings of fruit/d, May have a vegetable 1x/d, Tends to have meals with larger portions of grains/starches, Regularly consumes high fat processed meats, Eats fish regularly, " Routinely consumes processed snack chips, Rarely/Occasionally drinks alcohol, and Rarely consumes sugar sweetened beverages  Relationship with Food:   Appetite: Patient has been re-connecting to appetite signals  Binging: Never  Mindful Eating Habits: Reports frequently eating until 'stuffed'    Physical Activities:  Physical Activity  Physical Activity History: 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;  goes to gym and does treadmill 15 min/d    Nutrition Diagnosis      Nutrition Diagnosis  Patient has Nutrition Diagnosis: Yes  Diagnosis Status (1): New  Nutrition Diagnosis 1: Obese  Related to (1): energy intake in excess of energy expenditure  As Evidenced by (1): diet hx and BMI 39    Nutrition Interventions/Recommendations   Food and Nutrition Delivery  Meals & Snacks: General Healthful Diet, Energy-modified diet, Carbohydrate-modified diet  Nutrition Education  Nutrition Education Content: Content related nutrition education  Goals: 1)  Aim to start day with more protein at breakfast  2)  Aim to have at least 1-2 fistful of vegs with lunch and dinner 3) Practice mindul eating  4) Pack a more balanced lunch (ideas discussed)  Reviewed w/ pt strategies for weight loss including:  benefits of consistent meal and snack times;  mindful eating and paying attention to hunger and fullness cues;  MyPlate guidance for portions sizes;  strategies to increase fruits and vegetables in diet;  strategies for limiting low nutritional value foods; benefits of, and strategies for, menu planning and prepping meals ahead of meal times; benefits of fiber and protein with meals and snacks; benefits of regular movement/exercise.     Nutrition Monitoring and Evaluation   Food/Nutrient Related History Monitoring  Monitoring and Evaluation Plan: Meal/snack pattern, Energy intake  Energy Intake: Estimated energy intake  Criteria: decreased calorie intake  Meal/Snack Pattern: Food variety  Criteria: meals include lean protein  and vegetables  Body Composition/Growth/Weight History  Monitoring and Evaluation Plan: Weight  Weight: Measured weight  Criteria: weight loss of 4-6#/month

## 2024-10-03 NOTE — PATIENT INSTRUCTIONS
Nutrition Education Material: Achieving a Healthy Weight, Lifestyle Tips for Blood Sugar Control, and Mindful Eating  Provided patient with my office phone # and email address for any further questions.

## 2024-10-03 NOTE — PROGRESS NOTES
Chief Complaint   Patient presents with    Foot Skin Problem     Patient is here with a rash on top of left foot. Patient started using the ketoconazole cream     HPI: Patient was using ketoconazole cream since his last visit and visit and also using his topical combination cream for heel pain.  He started having increased dry scaly skin on the dorsal foot along with itching.  Patient did not try any other new products on the foot since last visit.  Patient stopped using both creams.  Overall he says his heel pain has improved on the right side.    Physical Exam  Patient alert, oriented, no acute distress  Respiratory: non labored breathing  Psychiatric: Mood and affect normal/baseline  HEENT: sclera clear    VASC: +2/4 pedal pulses B/L.  CFT brisk all digits.  Skin temperature is warm to warm proximal distal B/L.  (+)hair growth B/L.       NEURO: Vibratory intact B/L.  Light touch intact B/L.   5.07 Cary-Yari monofilament intact B/L.    DERM: Dry scaly skin on the bilateral foot more concentrated on the dorsal foot then the plantar foot which is a change since last visit.  No erythema is noted.  There is no weeping, no bulla noted.  Webspaces are dry, clean, and intact bilaterally.    MUSCULOSKEL: +5/5 muscle strength B/L.    Decreased ankle joint range of motion B/L  No pain upon palpation of the medial tubercle of the calcaneus base/L  No pain with lateral compression of calcaneus B/L  Windlass mechanism is intact B/L    Lab Results   Component Value Date    HGBA1C 6.7 (H) 06/22/2024      Assessment and Plan  #1 Plantar fasciitis B/L  Resolved    #2  Dermatitis bilateral foot with pruritus  Etiology unknown  Rx: Triamcinolone ointment apply this sparingly twice a day for 2 weeks  Did discuss the need to use a very mild moisturizer in the skin such as Lubriderm, CeraVe a, or Cetaphil  Follow-up as needed

## 2024-10-04 RX ORDER — BLOOD-GLUCOSE TRANSMITTER
EACH MISCELLANEOUS
Qty: 1 EACH | Refills: 0 | Status: SHIPPED | OUTPATIENT
Start: 2024-10-04

## 2024-10-15 ENCOUNTER — APPOINTMENT (OUTPATIENT)
Dept: NEPHROLOGY | Facility: CLINIC | Age: 54
End: 2024-10-15
Payer: COMMERCIAL

## 2024-10-15 VITALS
SYSTOLIC BLOOD PRESSURE: 142 MMHG | HEIGHT: 71 IN | HEART RATE: 64 BPM | BODY MASS INDEX: 38.36 KG/M2 | DIASTOLIC BLOOD PRESSURE: 88 MMHG | WEIGHT: 274 LBS

## 2024-10-15 DIAGNOSIS — E08.22 DIABETES MELLITUS DUE TO UNDERLYING CONDITION WITH DIABETIC CHRONIC KIDNEY DISEASE, UNSPECIFIED CKD STAGE, UNSPECIFIED WHETHER LONG TERM INSULIN USE: ICD-10-CM

## 2024-10-15 DIAGNOSIS — I10 ESSENTIAL HYPERTENSION: ICD-10-CM

## 2024-10-15 DIAGNOSIS — N18.31 STAGE 3A CHRONIC KIDNEY DISEASE (MULTI): Primary | ICD-10-CM

## 2024-10-15 PROCEDURE — 3077F SYST BP >= 140 MM HG: CPT | Performed by: INTERNAL MEDICINE

## 2024-10-15 PROCEDURE — 3044F HG A1C LEVEL LT 7.0%: CPT | Performed by: INTERNAL MEDICINE

## 2024-10-15 PROCEDURE — 99214 OFFICE O/P EST MOD 30 MIN: CPT | Performed by: INTERNAL MEDICINE

## 2024-10-15 PROCEDURE — 3061F NEG MICROALBUMINURIA REV: CPT | Performed by: INTERNAL MEDICINE

## 2024-10-15 PROCEDURE — 3079F DIAST BP 80-89 MM HG: CPT | Performed by: INTERNAL MEDICINE

## 2024-10-15 PROCEDURE — 3008F BODY MASS INDEX DOCD: CPT | Performed by: INTERNAL MEDICINE

## 2024-10-15 PROCEDURE — 4010F ACE/ARB THERAPY RXD/TAKEN: CPT | Performed by: INTERNAL MEDICINE

## 2024-10-15 PROCEDURE — 1036F TOBACCO NON-USER: CPT | Performed by: INTERNAL MEDICINE

## 2024-10-15 PROCEDURE — 3048F LDL-C <100 MG/DL: CPT | Performed by: INTERNAL MEDICINE

## 2024-10-15 NOTE — PROGRESS NOTES
Hadley Carrasco   54 y.o.    @@  [facility]/Room: [id number]/Room/bed info not found    Subjective:   The patient is being seen for a routine clinic follow-up of chronic kidney disease. Recently, the disease has been stable. Disease complications:  No hyperkalemia, no hypocalcemia, no hyperphosphatemia, no metabolic acidosis, no coagulopathy, no uremic encephalopathy, no neuropathy and no renal osteodystrophy. The patient is currently asymptomatic. No associated symptoms are reported.       Meds:   Current Outpatient Medications   Medication Sig Dispense Refill    acetaminophen (Tylenol Extra Strength) 500 mg tablet Take 2 tablets (1,000 mg) by mouth every 6 hours if needed for mild pain (1 - 3). 20 tablet 0    atorvastatin (Lipitor) 20 mg tablet Take 1 tablet (20 mg) by mouth once daily at bedtime. 90 tablet 3    blood pressure kit-extra large kit 1 kit once daily. 1 kit 0    chlorthalidone (Hygroton) 25 mg tablet TAKE ONE TABLET BY MOUTH ONCE DAILY 90 tablet 1    cyclobenzaprine (Flexeril) 10 mg tablet Take 1 tablet (10 mg) by mouth 3 times a day as needed for muscle spasms. 15 tablet 0    dapagliflozin propanediol (Farxiga) 10 mg Take 1 tablet (10 mg) by mouth once daily. 30 tablet 11    Dexcom G6  misc Use as instructed 1 each 0    Dexcom G6 Sensor device 1 box of 4 1 each 11    Dexcom G6 Transmitter device USE AS INSTRUCTED 1 each 0    dulaglutide (Trulicity) 0.75 mg/0.5 mL pen injector Inject 0.75 mg under the skin 1 (one) time per week. 2 mL 11    metFORMIN  mg 24 hr tablet TAKE ONE TABLET BY MOUTH TWICE A DAY WITH MEALS 60 tablet 3    ciclopirox (Penlac) 8 % solution Brush on toenails once daily for 48 weeks      clotrimazole-betamethasone (Lotrisone) lotion       ketoconazole (NIZOral) 2 % cream Apply topically 2 times a day. 60 g 3    losartan (Cozaar) 50 mg tablet Take 1 tablet (50 mg) by mouth once daily. 30 tablet 3    NON FORMULARY Klein's compound cream      potassium chloride CR 20 mEq ER  tablet Take 1 tablet (20 mEq) by mouth once daily as needed (for muscle cramps only). Do not crush or chew. 30 tablet 3    Qvar RediHaler 80 mcg/actuation inhaler Inhale 2 Inhalations 2 times a day. Rinse mouth with water after use to reduce aftertaste and incidence of candidiasis. Do not swallow. 10.6 g 1    sildenafil (Viagra) 100 mg tablet Take 1 tablet (100 mg) by mouth once daily as needed (Take 30-60 minutes before sexual activity.). 10 tablet 1    triamcinolone (Kenalog) 0.025 % ointment Apply topically 2 times a day. Apply topically twice a day for 2 weeks 80 g 1    True Metrix Glucose Test Strip strip       Ventolin HFA 90 mcg/actuation inhaler Inhale 2 puffs every 4 hours if needed (cough). 18 g 1     No current facility-administered medications for this visit.          ROS:  The patient is awake and oriented. No dizziness or lightheadedness. No chills and no fever. No headaches. No nausea and no vomiting. No shortness of breath. No cough. No sputum. No chest pain. No chest tightness. No abdominal pain. No diarrhea and no constipation. No hematemesis or hemoptysis. No hematuria. No rectal bleeding. No melena. No epistaxis. No urinary symptoms. No flank pain. No leg edema. No leg pain. No weakness. No itching. Overall, the rest of the review of systems is also negative.  12 point review of systems otherwise negative as stated in HPI.        Physical Examination:        Vitals:    10/15/24 1424   BP: 142/88   Pulse: 64     General: The patient is awake, oriented, and is not in any distress.  Head and Neck: Normocephalic. No periorbital edema.  Eyes: non-icteric  Respiratory: Symmetric air entry. Symmetric chest expansion.No respiratory distress.  Skin: No maculopapular rash.  Musculoskeletal: No peripheral edema in both left and right upper extremities.  No edema in either left or right lower extremities.  Neuro Exam: Speech is fluent. Moves extremities.    Imaging:  === 03/30/23 ===    US RENAL  COMPLETE    - Impression -  Unremarkable renal ultrasound.       Blood Labs:  No results found for this or any previous visit (from the past 24 hour(s)).   Lab Results   Component Value Date    PTH 45.8 04/24/2024    PROTUR NEGATIVE 04/22/2023    PROTUR 8 03/28/2023    PHOS 4.0 03/28/2023      Lab Results   Component Value Date    GLUCOSE 112 (H) 06/22/2024    CALCIUM 9.4 06/22/2024     06/22/2024    K 3.9 06/22/2024    CO2 31 06/22/2024     06/22/2024    BUN 16 06/22/2024    CREATININE 1.63 (H) 06/22/2024         Assessment and Plan:  1. Chronic kidney disease stage III. Last creatinine level from June 2024 is 1.63.  I asked for basic metabolic panel to be done today.  PTH and 25-hydroxy vitamin D level to be checked.     2. Hypertension. His blood pressure is  higher than the goal.  He checks his blood pressure at home and constantly his systolic blood pressure is below 130.  Continue the current medications.     3.  Diabetes.  No proteinuria based on last spot urine protein to creatinine ratio.       I will see him in about 6-month for follow-up           Shaun Jacobson MD  Senior Attending Physician  Director of Onco-Nephrology Program  Division of Nephrology & Hypertension  [facility]

## 2024-10-19 ENCOUNTER — LAB (OUTPATIENT)
Dept: LAB | Facility: LAB | Age: 54
End: 2024-10-19
Payer: COMMERCIAL

## 2024-10-19 DIAGNOSIS — E08.22 DIABETES MELLITUS DUE TO UNDERLYING CONDITION WITH DIABETIC CHRONIC KIDNEY DISEASE, UNSPECIFIED CKD STAGE, UNSPECIFIED WHETHER LONG TERM INSULIN USE: ICD-10-CM

## 2024-10-19 DIAGNOSIS — I10 ESSENTIAL HYPERTENSION: ICD-10-CM

## 2024-10-19 DIAGNOSIS — N18.31 STAGE 3A CHRONIC KIDNEY DISEASE (MULTI): ICD-10-CM

## 2024-10-19 DIAGNOSIS — E11.9 TYPE 2 DIABETES MELLITUS WITHOUT COMPLICATION, WITHOUT LONG-TERM CURRENT USE OF INSULIN (MULTI): ICD-10-CM

## 2024-10-19 DIAGNOSIS — I10 PRIMARY HYPERTENSION: ICD-10-CM

## 2024-10-19 LAB
25(OH)D3 SERPL-MCNC: 35 NG/ML (ref 30–100)
ANION GAP SERPL CALC-SCNC: 10 MMOL/L (ref 10–20)
BUN SERPL-MCNC: 21 MG/DL (ref 6–23)
CALCIUM SERPL-MCNC: 9.8 MG/DL (ref 8.6–10.3)
CHLORIDE SERPL-SCNC: 99 MMOL/L (ref 98–107)
CO2 SERPL-SCNC: 33 MMOL/L (ref 21–32)
CREAT SERPL-MCNC: 1.58 MG/DL (ref 0.5–1.3)
CREAT UR-MCNC: 195.6 MG/DL (ref 20–370)
EGFRCR SERPLBLD CKD-EPI 2021: 51 ML/MIN/1.73M*2
GLUCOSE SERPL-MCNC: 91 MG/DL (ref 74–99)
POTASSIUM SERPL-SCNC: 3.1 MMOL/L (ref 3.5–5.3)
PROT UR-ACNC: 15 MG/DL (ref 5–25)
PROT/CREAT UR: 0.08 MG/MG CREAT (ref 0–0.17)
PTH-INTACT SERPL-MCNC: 38.4 PG/ML (ref 18.5–88)
SODIUM SERPL-SCNC: 139 MMOL/L (ref 136–145)

## 2024-10-19 PROCEDURE — 83970 ASSAY OF PARATHORMONE: CPT

## 2024-10-19 PROCEDURE — 80048 BASIC METABOLIC PNL TOTAL CA: CPT

## 2024-10-19 PROCEDURE — 82570 ASSAY OF URINE CREATININE: CPT

## 2024-10-19 PROCEDURE — 82306 VITAMIN D 25 HYDROXY: CPT

## 2024-10-19 PROCEDURE — 36415 COLL VENOUS BLD VENIPUNCTURE: CPT

## 2024-10-19 PROCEDURE — 84156 ASSAY OF PROTEIN URINE: CPT

## 2024-10-21 RX ORDER — LOSARTAN POTASSIUM 50 MG/1
50 TABLET ORAL DAILY
Qty: 30 TABLET | Refills: 3 | Status: SHIPPED | OUTPATIENT
Start: 2024-10-21

## 2024-10-21 RX ORDER — METFORMIN HYDROCHLORIDE 500 MG/1
TABLET, EXTENDED RELEASE ORAL
Qty: 30 TABLET | Refills: 3 | Status: SHIPPED | OUTPATIENT
Start: 2024-10-21

## 2024-10-22 ENCOUNTER — OFFICE VISIT (OUTPATIENT)
Dept: GASTROENTEROLOGY | Facility: HOSPITAL | Age: 54
End: 2024-10-22
Payer: COMMERCIAL

## 2024-10-22 DIAGNOSIS — K76.0 FATTY LIVER: Primary | ICD-10-CM

## 2024-10-22 PROCEDURE — 99213 OFFICE O/P EST LOW 20 MIN: CPT | Performed by: NURSE PRACTITIONER

## 2024-10-22 PROCEDURE — 3061F NEG MICROALBUMINURIA REV: CPT | Performed by: NURSE PRACTITIONER

## 2024-10-22 PROCEDURE — 3048F LDL-C <100 MG/DL: CPT | Performed by: NURSE PRACTITIONER

## 2024-10-22 PROCEDURE — 3044F HG A1C LEVEL LT 7.0%: CPT | Performed by: NURSE PRACTITIONER

## 2024-10-22 PROCEDURE — 4010F ACE/ARB THERAPY RXD/TAKEN: CPT | Performed by: NURSE PRACTITIONER

## 2024-10-22 PROCEDURE — 1036F TOBACCO NON-USER: CPT | Performed by: NURSE PRACTITIONER

## 2024-10-22 ASSESSMENT — ENCOUNTER SYMPTOMS
NAUSEA: 0
VOMITING: 0
FEVER: 0
COUGH: 0
FREQUENCY: 0
ABDOMINAL PAIN: 0
NUMBNESS: 0
VOICE CHANGE: 0
DIZZINESS: 0
BLOOD IN STOOL: 0
CONFUSION: 0
UNEXPECTED WEIGHT CHANGE: 0
WEAKNESS: 0
PALPITATIONS: 0
HEADACHES: 0
COLOR CHANGE: 0
DIARRHEA: 0
APPETITE CHANGE: 0
WHEEZING: 0
HEMATURIA: 0
CHILLS: 0
DYSURIA: 0
JOINT SWELLING: 0
CONSTIPATION: 0
LIGHT-HEADEDNESS: 0
TREMORS: 0
TROUBLE SWALLOWING: 0
SHORTNESS OF BREATH: 0
DIFFICULTY URINATING: 0
AGITATION: 0
SLEEP DISTURBANCE: 0
BRUISES/BLEEDS EASILY: 0
ABDOMINAL DISTENTION: 0

## 2024-10-22 NOTE — PROGRESS NOTES
"  Patient ID: Hadley Carrasco is a 54 y.o. male who presents for evaluation of liver.    10/22/2024:  FUV to discuss fibroscan.   Results show severe steatosis and minimal fibrosis.  ( and Kpa 7.1).  He has been working on losing weight and has lost ~ 15lbs since his initial visit.  He is also working with a nutrition  to help with food choices.  No other issues.  Feels great.  Would like to get off of some medications, which is one of his goals for weight loss.    ---------------------------------------------------------------------------    HPI  54 year old referred by his PCP for \"colon test\".   He is unsure why he is here, though he was screened for Hep C which confirms exposure but he does not have detectable virus.    He does not know how be may have contracted Hep C, but explained that whenever that may have happened, he cleared it on his own.  He had a colonoscopy in 2020 and is not due again until 2030.    He has risk factors for fatty liver including obesity, DM, HTN and HLD.  Previous normal liver enzymes/function tests.  Asymptomatic.    Denies jaundice, icterus, itching, abdominal distension, edema, bleeding or confusion.    Denies fevers, chills, abdominal pain.       Review of Systems   Constitutional:  Negative for appetite change, chills, fever and unexpected weight change.   HENT:  Negative for mouth sores, nosebleeds, trouble swallowing and voice change.    Eyes:  Negative for visual disturbance.   Respiratory:  Negative for cough, shortness of breath and wheezing.    Cardiovascular:  Negative for chest pain, palpitations and leg swelling.   Gastrointestinal:  Negative for abdominal distention, abdominal pain, blood in stool, constipation, diarrhea, nausea and vomiting.   Genitourinary:  Negative for decreased urine volume, difficulty urinating, dysuria, frequency, hematuria and urgency.   Musculoskeletal:  Negative for gait problem and joint swelling.   Skin:  Negative for color " change, pallor and rash.   Neurological:  Negative for dizziness, tremors, weakness, light-headedness, numbness and headaches.   Hematological:  Does not bruise/bleed easily.   Psychiatric/Behavioral:  Negative for agitation, behavioral problems, confusion and sleep disturbance.        Objective   Physical Exam  Constitutional:       General: He is awake.      Appearance: Normal appearance. He is well-developed.   HENT:      Head: Normocephalic and atraumatic.      Right Ear: Hearing normal.      Left Ear: Hearing normal.      Nose: Nose normal.      Mouth/Throat:      Lips: Pink.      Mouth: Mucous membranes are moist.   Eyes:      General: Lids are normal.      Extraocular Movements: Extraocular movements intact.      Conjunctiva/sclera: Conjunctivae normal.      Pupils: Pupils are equal, round, and reactive to light.   Cardiovascular:      Rate and Rhythm: Normal rate and regular rhythm.      Pulses: Normal pulses.      Heart sounds: Normal heart sounds.   Pulmonary:      Effort: Pulmonary effort is normal.      Breath sounds: Normal breath sounds.   Abdominal:      General: Abdomen is flat. Bowel sounds are normal.      Palpations: Abdomen is soft.   Musculoskeletal:      Cervical back: Normal range of motion and neck supple.   Feet:      Right foot:      Skin integrity: Skin integrity normal.      Left foot:      Skin integrity: Skin integrity normal.   Skin:     General: Skin is warm.   Neurological:      General: No focal deficit present.      Mental Status: He is alert and oriented to person, place, and time.      Cranial Nerves: Cranial nerves 2-12 are intact.      Sensory: Sensation is intact.      Motor: Motor function is intact.      Coordination: Coordination is intact.      Gait: Gait is intact.   Psychiatric:         Attention and Perception: Attention and perception normal.         Mood and Affect: Mood normal.         Speech: Speech normal.         Behavior: Behavior is cooperative.         Thought  Content: Thought content normal.         Cognition and Memory: Cognition normal.         Judgment: Judgment normal.         Current Outpatient Medications   Medication Sig Dispense Refill    acetaminophen (Tylenol Extra Strength) 500 mg tablet Take 2 tablets (1,000 mg) by mouth every 6 hours if needed for mild pain (1 - 3). 20 tablet 0    atorvastatin (Lipitor) 20 mg tablet Take 1 tablet (20 mg) by mouth once daily at bedtime. 90 tablet 3    blood pressure kit-extra large kit 1 kit once daily. 1 kit 0    chlorthalidone (Hygroton) 25 mg tablet TAKE ONE TABLET BY MOUTH ONCE DAILY 90 tablet 1    ciclopirox (Penlac) 8 % solution Brush on toenails once daily for 48 weeks      clotrimazole-betamethasone (Lotrisone) lotion       cyclobenzaprine (Flexeril) 10 mg tablet Take 1 tablet (10 mg) by mouth 3 times a day as needed for muscle spasms. 15 tablet 0    dapagliflozin propanediol (Farxiga) 10 mg Take 1 tablet (10 mg) by mouth once daily. 30 tablet 11    Dexcom G6  misc Use as instructed 1 each 0    Dexcom G6 Sensor device 1 box of 4 1 each 11    Dexcom G6 Transmitter device USE AS INSTRUCTED 1 each 0    dulaglutide (Trulicity) 0.75 mg/0.5 mL pen injector Inject 0.75 mg under the skin 1 (one) time per week. 2 mL 11    ketoconazole (NIZOral) 2 % cream Apply topically 2 times a day. 60 g 3    losartan (Cozaar) 50 mg tablet TAKE ONE TABLET BY MOUTH EVERY DAY 30 tablet 3    metFORMIN  mg 24 hr tablet TAKE 1 TABLET BY MOUTH TWO TIMES A DAY ONCE IN THE MORNING AND THEN LATE AFTERNOON 30 tablet 3    NON FORMULARY Klein's compound cream      potassium chloride CR 20 mEq ER tablet Take 1 tablet (20 mEq) by mouth once daily as needed (for muscle cramps only). Do not crush or chew. 30 tablet 3    Qvar RediHaler 80 mcg/actuation inhaler Inhale 2 Inhalations 2 times a day. Rinse mouth with water after use to reduce aftertaste and incidence of candidiasis. Do not swallow. 10.6 g 1    sildenafil (Viagra) 100 mg tablet Take  1 tablet (100 mg) by mouth once daily as needed (Take 30-60 minutes before sexual activity.). 10 tablet 1    triamcinolone (Kenalog) 0.025 % ointment Apply topically 2 times a day. Apply topically twice a day for 2 weeks 80 g 1    True Metrix Glucose Test Strip strip       Ventolin HFA 90 mcg/actuation inhaler Inhale 2 puffs every 4 hours if needed (cough). 18 g 1     No current facility-administered medications for this visit.     LABS:   Lab Results   Component Value Date    ALBUMIN 4.0 06/22/2024    BILITOT 1.0 06/22/2024    ALKPHOS 67 06/22/2024    ALT 24 06/22/2024    AST 22 06/22/2024    PROT 7.1 06/22/2024             Assessment/Plan   Problem List Items Addressed This Visit             ICD-10-CM    Fatty liver - Primary K76.0     MASLD with severe steatosis and minimal fibrosis.  Working on weight loss and has lost 15lbs.  Encouraged to continue lifestyle changes, which will help with improvement in fatty liver.  Recommend repeat fibroscan in 1 year.                     GEOFFREY James-CNP 10/22/24 2:18 PM

## 2024-10-22 NOTE — ASSESSMENT & PLAN NOTE
MASLD with severe steatosis and minimal fibrosis.  Working on weight loss and has lost 15lbs.  Encouraged to continue lifestyle changes, which will help with improvement in fatty liver.  Recommend repeat fibroscan in 1 year.

## 2024-10-23 ENCOUNTER — APPOINTMENT (OUTPATIENT)
Dept: PRIMARY CARE | Facility: CLINIC | Age: 54
End: 2024-10-23
Payer: COMMERCIAL

## 2024-10-23 VITALS
DIASTOLIC BLOOD PRESSURE: 80 MMHG | OXYGEN SATURATION: 98 % | BODY MASS INDEX: 37.66 KG/M2 | WEIGHT: 270 LBS | HEART RATE: 70 BPM | SYSTOLIC BLOOD PRESSURE: 132 MMHG | TEMPERATURE: 96.8 F

## 2024-10-23 DIAGNOSIS — K76.0 FATTY LIVER: ICD-10-CM

## 2024-10-23 DIAGNOSIS — N18.31 STAGE 3A CHRONIC KIDNEY DISEASE (MULTI): ICD-10-CM

## 2024-10-23 DIAGNOSIS — E55.9 VITAMIN D DEFICIENCY: ICD-10-CM

## 2024-10-23 DIAGNOSIS — R07.81 RIB PAIN ON RIGHT SIDE: ICD-10-CM

## 2024-10-23 DIAGNOSIS — E78.49 OTHER HYPERLIPIDEMIA: ICD-10-CM

## 2024-10-23 DIAGNOSIS — E87.6 HYPOKALEMIA: ICD-10-CM

## 2024-10-23 DIAGNOSIS — R10.11 RUQ PAIN: Primary | ICD-10-CM

## 2024-10-23 DIAGNOSIS — E11.9 TYPE 2 DIABETES MELLITUS WITHOUT COMPLICATION, WITHOUT LONG-TERM CURRENT USE OF INSULIN (MULTI): ICD-10-CM

## 2024-10-23 DIAGNOSIS — I10 PRIMARY HYPERTENSION: ICD-10-CM

## 2024-10-23 LAB — POC HEMOGLOBIN A1C: 6.7 % (ref 4.2–6.5)

## 2024-10-23 PROCEDURE — 3048F LDL-C <100 MG/DL: CPT | Performed by: NURSE PRACTITIONER

## 2024-10-23 PROCEDURE — 4010F ACE/ARB THERAPY RXD/TAKEN: CPT | Performed by: NURSE PRACTITIONER

## 2024-10-23 PROCEDURE — 3061F NEG MICROALBUMINURIA REV: CPT | Performed by: NURSE PRACTITIONER

## 2024-10-23 PROCEDURE — 83036 HEMOGLOBIN GLYCOSYLATED A1C: CPT | Performed by: NURSE PRACTITIONER

## 2024-10-23 PROCEDURE — 3075F SYST BP GE 130 - 139MM HG: CPT | Performed by: NURSE PRACTITIONER

## 2024-10-23 PROCEDURE — 3044F HG A1C LEVEL LT 7.0%: CPT | Performed by: NURSE PRACTITIONER

## 2024-10-23 PROCEDURE — 99213 OFFICE O/P EST LOW 20 MIN: CPT | Performed by: NURSE PRACTITIONER

## 2024-10-23 PROCEDURE — 3079F DIAST BP 80-89 MM HG: CPT | Performed by: NURSE PRACTITIONER

## 2024-10-23 RX ORDER — DICLOFENAC SODIUM 10 MG/G
4 GEL TOPICAL 2 TIMES DAILY PRN
Qty: 100 G | Refills: 0 | Status: SHIPPED | OUTPATIENT
Start: 2024-10-23

## 2024-10-23 RX ORDER — BLOOD-GLUCOSE,RECEIVER,CONT
EACH MISCELLANEOUS
Qty: 1 EACH | Refills: 0 | Status: SHIPPED | OUTPATIENT
Start: 2024-10-23

## 2024-10-23 RX ORDER — BLOOD-GLUCOSE SENSOR
EACH MISCELLANEOUS
Qty: 1 EACH | Refills: 11 | Status: SHIPPED | OUTPATIENT
Start: 2024-10-23

## 2024-10-23 NOTE — PROGRESS NOTES
Subjective   Patient ID: Hadley Carrasco is a 54 y.o. male who presents for Follow-up.    HPI   Pleasant established 54 y/o male with a PMH of HTN, HLD, T2DM, obesity, vitamin d deficiency, ED, CKD stage 3, hypokalemia, asthma, & LESA presents for follow up    Taking Ozempic, HGA1C today remains at 6.7, notes his Dexcom 6 is reporting low bg but numbers don't match when machine is interrogated. Would like order for Dexcom 7. Advised to reach out to  as his machine may be defective?    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 Non-radiating, had same for several months, prior workups negative, spontaneously resolved and now returned. Denies trauma or injury. Has tried Tylenol with no resolution, Flexeril in past did not help     Endorses exercise, stretches, working on weight loss, down 19# total, working with Nutritionist    Steatosis, Liver fibrosis- Repeat fibroscan 1 year per Hepatology  CKD-stable, following with Nephrology, Dr Jacobson  HTN- checking at home, running in 120's/70's, in office 132/80. Continue to monitor      Review of Systems  Review of Systems   Constitutional: Negative.    Respiratory: Negative.     Cardiovascular: Negative.    Gastrointestinal: Negative.    Musculoskeletal: HPI  All other systems reviewed and are negative.    Objective   /80 (BP Location: Left arm, Patient Position: Sitting)   Pulse 70   Temp 36 °C (96.8 °F)   Wt 122 kg (270 lb)   SpO2 98%   BMI 37.66 kg/m²     Physical Exam  Vitals reviewed.   Constitutional:       Appearance: Normal appearance.   Cardiovascular:      Rate and Rhythm: Normal rate and regular rhythm.   Pulmonary:      Effort: Pulmonary effort is normal.      Breath sounds: Normal breath sounds. No wheezing.   Musculoskeletal:         General: No swelling, tenderness or signs of injury. Normal range of motion.      Cervical back: Normal range of motion and neck  supple.   Neurological:      Mental Status: He is alert.         Assessment/Plan   Problem List Items Addressed This Visit             ICD-10-CM    Type 2 diabetes mellitus without complication, without long-term current use of insulin (Multi) E11.9    Relevant Medications    Dexcom G7  misc    Dexcom G7 Sensor device    Other Relevant Orders    POCT glycosylated hemoglobin (Hb A1C) manually resulted (Completed)    Comprehensive metabolic panel    Hypertension I10    Relevant Orders    Comprehensive metabolic panel    Other hyperlipidemia E78.49    Relevant Orders    Comprehensive metabolic panel    Stage 3a chronic kidney disease (Multi) N18.31    Relevant Orders    Comprehensive metabolic panel    Hypokalemia E87.6    Relevant Orders    Comprehensive metabolic panel    Rib pain on right side R07.81    Relevant Medications    diclofenac sodium (Voltaren) 1 % gel    RUQ pain - Primary R10.11    Relevant Medications    diclofenac sodium (Voltaren) 1 % gel    Other Relevant Orders    Comprehensive metabolic panel    Vitamin D deficiency E55.9    Fatty liver K76.0    Relevant Orders    Comprehensive metabolic panel

## 2024-10-24 ENCOUNTER — APPOINTMENT (OUTPATIENT)
Dept: GASTROENTEROLOGY | Facility: CLINIC | Age: 54
End: 2024-10-24
Payer: COMMERCIAL

## 2024-10-29 ENCOUNTER — APPOINTMENT (OUTPATIENT)
Dept: PODIATRY | Facility: CLINIC | Age: 54
End: 2024-10-29
Payer: COMMERCIAL

## 2024-11-02 ENCOUNTER — LAB (OUTPATIENT)
Dept: LAB | Facility: LAB | Age: 54
End: 2024-11-02
Payer: COMMERCIAL

## 2024-11-02 LAB
ALBUMIN SERPL BCP-MCNC: 4.1 G/DL (ref 3.4–5)
ALP SERPL-CCNC: 60 U/L (ref 33–120)
ALT SERPL W P-5'-P-CCNC: 22 U/L (ref 10–52)
ANION GAP SERPL CALC-SCNC: 11 MMOL/L (ref 10–20)
AST SERPL W P-5'-P-CCNC: 23 U/L (ref 9–39)
BILIRUB SERPL-MCNC: 1.1 MG/DL (ref 0–1.2)
BUN SERPL-MCNC: 16 MG/DL (ref 6–23)
CALCIUM SERPL-MCNC: 9.2 MG/DL (ref 8.6–10.3)
CHLORIDE SERPL-SCNC: 101 MMOL/L (ref 98–107)
CO2 SERPL-SCNC: 33 MMOL/L (ref 21–32)
CREAT SERPL-MCNC: 1.55 MG/DL (ref 0.5–1.3)
EGFRCR SERPLBLD CKD-EPI 2021: 53 ML/MIN/1.73M*2
GLUCOSE SERPL-MCNC: 91 MG/DL (ref 74–99)
POTASSIUM SERPL-SCNC: 3.2 MMOL/L (ref 3.5–5.3)
PROT SERPL-MCNC: 7.1 G/DL (ref 6.4–8.2)
SODIUM SERPL-SCNC: 142 MMOL/L (ref 136–145)

## 2024-11-02 PROCEDURE — 80053 COMPREHEN METABOLIC PANEL: CPT

## 2024-11-06 ENCOUNTER — APPOINTMENT (OUTPATIENT)
Dept: PODIATRY | Facility: CLINIC | Age: 54
End: 2024-11-06
Payer: COMMERCIAL

## 2024-11-14 ENCOUNTER — APPOINTMENT (OUTPATIENT)
Dept: OPHTHALMOLOGY | Facility: CLINIC | Age: 54
End: 2024-11-14
Payer: COMMERCIAL

## 2024-11-18 ENCOUNTER — APPOINTMENT (OUTPATIENT)
Dept: NUTRITION | Facility: CLINIC | Age: 54
End: 2024-11-18
Payer: COMMERCIAL

## 2024-11-20 ENCOUNTER — APPOINTMENT (OUTPATIENT)
Dept: NUTRITION | Facility: CLINIC | Age: 54
End: 2024-11-20
Payer: COMMERCIAL

## 2024-12-23 ENCOUNTER — APPOINTMENT (OUTPATIENT)
Dept: PRIMARY CARE | Facility: CLINIC | Age: 54
End: 2024-12-23
Payer: COMMERCIAL

## 2025-01-10 ENCOUNTER — APPOINTMENT (OUTPATIENT)
Dept: OPHTHALMOLOGY | Facility: CLINIC | Age: 55
End: 2025-01-10
Payer: COMMERCIAL

## 2025-01-10 DIAGNOSIS — H52.223 REGULAR ASTIGMATISM OF BOTH EYES: ICD-10-CM

## 2025-01-10 DIAGNOSIS — H52.13 MYOPIA OF BOTH EYES: Primary | ICD-10-CM

## 2025-01-10 DIAGNOSIS — H52.4 PRESBYOPIA: ICD-10-CM

## 2025-01-10 DIAGNOSIS — E11.9 TYPE 2 DIABETES MELLITUS WITHOUT COMPLICATION, WITHOUT LONG-TERM CURRENT USE OF INSULIN (MULTI): ICD-10-CM

## 2025-01-10 DIAGNOSIS — H35.371 EPIRETINAL MEMBRANE (ERM) OF RIGHT EYE: ICD-10-CM

## 2025-01-10 PROCEDURE — 92015 DETERMINE REFRACTIVE STATE: CPT | Performed by: OPTOMETRIST

## 2025-01-10 PROCEDURE — 92004 COMPRE OPH EXAM NEW PT 1/>: CPT | Performed by: OPTOMETRIST

## 2025-01-10 PROCEDURE — 92134 CPTRZ OPH DX IMG PST SGM RTA: CPT | Performed by: OPTOMETRIST

## 2025-01-10 ASSESSMENT — ENCOUNTER SYMPTOMS
HEMATOLOGIC/LYMPHATIC NEGATIVE: 0
MUSCULOSKELETAL NEGATIVE: 0
PSYCHIATRIC NEGATIVE: 0
ENDOCRINE NEGATIVE: 0
EYES NEGATIVE: 0
RESPIRATORY NEGATIVE: 0
GASTROINTESTINAL NEGATIVE: 0
ALLERGIC/IMMUNOLOGIC NEGATIVE: 0
NEUROLOGICAL NEGATIVE: 0
CONSTITUTIONAL NEGATIVE: 0
CARDIOVASCULAR NEGATIVE: 0

## 2025-01-10 ASSESSMENT — CONF VISUAL FIELD
METHOD: COUNTING FINGERS
OD_SUPERIOR_NASAL_RESTRICTION: 0
OS_NORMAL: 1
OD_SUPERIOR_TEMPORAL_RESTRICTION: 0
OS_SUPERIOR_TEMPORAL_RESTRICTION: 0
OD_NORMAL: 1
OS_SUPERIOR_NASAL_RESTRICTION: 0
OS_INFERIOR_TEMPORAL_RESTRICTION: 0
OD_INFERIOR_TEMPORAL_RESTRICTION: 0
OS_INFERIOR_NASAL_RESTRICTION: 0
OD_INFERIOR_NASAL_RESTRICTION: 0

## 2025-01-10 ASSESSMENT — REFRACTION_MANIFEST
OS_AXIS: 080
OD_CYLINDER: -0.50
OS_CYLINDER: -0.75
OD_AXIS: 110
METHOD_AUTOREFRACTION: 1
OD_CYLINDER: -1.50
OD_SPHERE: -2.25
OS_SPHERE: -2.00
OS_AXIS: 078
OD_AXIS: 110
OS_SPHERE: -2.25
OS_CYLINDER: -0.75
OD_SPHERE: -2.00
OS_ADD: +2.25
OD_ADD: +2.25

## 2025-01-10 ASSESSMENT — VISUAL ACUITY
METHOD: SNELLEN - LINEAR
OD_CC: 20/50-2
OS_CC: 20/20-1
CORRECTION_TYPE: GLASSES

## 2025-01-10 ASSESSMENT — EXTERNAL EXAM - RIGHT EYE: OD_EXAM: NORMAL

## 2025-01-10 ASSESSMENT — REFRACTION
OD_CYLINDER: -0.25
OS_CYLINDER: -0.75
OD_AXIS: 120
OD_SPHERE: -2.25
OS_SPHERE: -2.00
OS_AXIS: 071

## 2025-01-10 ASSESSMENT — REFRACTION_WEARINGRX
OD_AXIS: 127
OS_SPHERE: -1.75
OD_CYLINDER: -0.50
OS_AXIS: 071
OS_CYLINDER: -0.75
OD_SPHERE: -1.50

## 2025-01-10 ASSESSMENT — SLIT LAMP EXAM - LIDS
COMMENTS: NORMAL
COMMENTS: NORMAL

## 2025-01-10 ASSESSMENT — CUP TO DISC RATIO
OD_RATIO: 0.3
OS_RATIO: 0.3

## 2025-01-10 ASSESSMENT — TONOMETRY
OS_IOP_MMHG: 16
IOP_METHOD: GOLDMANN APPLANATION
OD_IOP_MMHG: 16

## 2025-01-10 ASSESSMENT — EXTERNAL EXAM - LEFT EYE: OS_EXAM: NORMAL

## 2025-01-10 NOTE — PROGRESS NOTES
Assessment/Plan   Diagnoses and all orders for this visit:  Myopia of both eyes  Regular astigmatism of both eyes  Presbyopia  New spec rx released today per patient request. Ocular health wnl for age OU. Monitor 1 year or sooner prn. Refraction billed today. Pt consents to receiving glasses Rx today. Patient's/guardian's signature obtained to acknowledge and confirm that a paper copy of glasses Rx was given to patient in compliance with [facility] Eyeglass Rule. Electronic copy of Rx will also be available via [employer].     Epiretinal membrane (ERM) of right eye  -     OCT, Retina - OU - Both Eyes  Discussed findings and etiology. ERM not visually significant at this time. Amsler grid for at home monitoring. RTC 6 months OCT Macula or sooner with changes on grid.    Type 2 diabetes mellitus without complication, without long-term current use of insulin (Multi)  The patient has diabetes without any evidence of retinopathy.  The patient was advised to maintain tight glucose control, tight blood pressure control, and favorable levels of cholesterol, low density lipoprotein, and high density lipoproteins.  Follow up in one year was recommended.

## 2025-01-12 DIAGNOSIS — E11.9 TYPE 2 DIABETES MELLITUS WITHOUT COMPLICATION, WITHOUT LONG-TERM CURRENT USE OF INSULIN (MULTI): ICD-10-CM

## 2025-01-13 DIAGNOSIS — E11.9 TYPE 2 DIABETES MELLITUS WITHOUT COMPLICATION, WITHOUT LONG-TERM CURRENT USE OF INSULIN (MULTI): ICD-10-CM

## 2025-01-13 RX ORDER — METFORMIN HYDROCHLORIDE 500 MG/1
TABLET, EXTENDED RELEASE ORAL
Qty: 30 TABLET | Refills: 5 | Status: SHIPPED | OUTPATIENT
Start: 2025-01-13

## 2025-01-14 RX ORDER — BLOOD-GLUCOSE TRANSMITTER
EACH MISCELLANEOUS
Qty: 1 EACH | Refills: 0 | Status: SHIPPED | OUTPATIENT
Start: 2025-01-14

## 2025-02-16 DIAGNOSIS — I10 PRIMARY HYPERTENSION: ICD-10-CM

## 2025-02-17 RX ORDER — CHLORTHALIDONE 25 MG/1
25 TABLET ORAL DAILY
Qty: 90 TABLET | Refills: 1 | Status: SHIPPED | OUTPATIENT
Start: 2025-02-17

## 2025-02-17 RX ORDER — LOSARTAN POTASSIUM 50 MG/1
50 TABLET ORAL DAILY
Qty: 90 TABLET | Refills: 1 | Status: SHIPPED | OUTPATIENT
Start: 2025-02-17

## 2025-04-15 ENCOUNTER — APPOINTMENT (OUTPATIENT)
Dept: NEPHROLOGY | Facility: CLINIC | Age: 55
End: 2025-04-15
Payer: COMMERCIAL

## 2025-04-15 VITALS
WEIGHT: 265 LBS | DIASTOLIC BLOOD PRESSURE: 85 MMHG | BODY MASS INDEX: 36.96 KG/M2 | SYSTOLIC BLOOD PRESSURE: 138 MMHG | HEART RATE: 78 BPM

## 2025-04-15 DIAGNOSIS — N18.31 STAGE 3A CHRONIC KIDNEY DISEASE (MULTI): Primary | ICD-10-CM

## 2025-04-15 DIAGNOSIS — E08.22 DIABETES MELLITUS DUE TO UNDERLYING CONDITION WITH DIABETIC CHRONIC KIDNEY DISEASE, UNSPECIFIED CKD STAGE, UNSPECIFIED WHETHER LONG TERM INSULIN USE: ICD-10-CM

## 2025-04-15 DIAGNOSIS — I10 ESSENTIAL HYPERTENSION: ICD-10-CM

## 2025-04-15 PROCEDURE — 99213 OFFICE O/P EST LOW 20 MIN: CPT | Performed by: INTERNAL MEDICINE

## 2025-04-15 PROCEDURE — 3075F SYST BP GE 130 - 139MM HG: CPT | Performed by: INTERNAL MEDICINE

## 2025-04-15 PROCEDURE — 4010F ACE/ARB THERAPY RXD/TAKEN: CPT | Performed by: INTERNAL MEDICINE

## 2025-04-15 PROCEDURE — 3079F DIAST BP 80-89 MM HG: CPT | Performed by: INTERNAL MEDICINE

## 2025-04-15 PROCEDURE — 1036F TOBACCO NON-USER: CPT | Performed by: INTERNAL MEDICINE

## 2025-04-15 NOTE — PROGRESS NOTES
Hadley Carrasco   54 y.o.    @@  [facility]/Room: [id number]/Room/bed info not found    Subjective:   The patient is being seen for a routine clinic follow-up of chronic kidney disease. Recently, the disease has been stable. Disease complications:  No hyperkalemia, no hypocalcemia, no hyperphosphatemia, no metabolic acidosis, no coagulopathy, no uremic encephalopathy, no neuropathy and no renal osteodystrophy. The patient is currently asymptomatic. No associated symptoms are reported.       Meds:   Current Outpatient Medications   Medication Sig Dispense Refill    acetaminophen (Tylenol Extra Strength) 500 mg tablet Take 2 tablets (1,000 mg) by mouth every 6 hours if needed for mild pain (1 - 3). 20 tablet 0    atorvastatin (Lipitor) 20 mg tablet Take 1 tablet (20 mg) by mouth once daily at bedtime. 90 tablet 3    blood pressure kit-extra large kit 1 kit once daily. 1 kit 0    chlorthalidone (Hygroton) 25 mg tablet TAKE ONE TABLET BY MOUTH ONCE DAILY 90 tablet 1    ciclopirox (Penlac) 8 % solution Brush on toenails once daily for 48 weeks      clotrimazole-betamethasone (Lotrisone) lotion       cyclobenzaprine (Flexeril) 10 mg tablet Take 1 tablet (10 mg) by mouth 3 times a day as needed for muscle spasms. 15 tablet 0    dapagliflozin propanediol (Farxiga) 10 mg Take 1 tablet (10 mg) by mouth once daily. 30 tablet 11    Dexcom G6 Transmitter device USE AS INSTRUCTED 1 each 0    Dexcom G7  misc Use as instructed 1 each 0    Dexcom G7 Sensor device Use as directed 1 each 11    diclofenac sodium (Voltaren) 1 % gel Apply 4.5 inches (4 g) topically 2 times a day as needed (muscle pain). 100 g 0    dulaglutide (Trulicity) 0.75 mg/0.5 mL pen injector Inject 0.75 mg under the skin 1 (one) time per week. 2 mL 11    ketoconazole (NIZOral) 2 % cream Apply topically 2 times a day. 60 g 3    losartan (Cozaar) 50 mg tablet TAKE ONE TABLET BY MOUTH EVERY DAY 90 tablet 1    metFORMIN  mg 24 hr tablet TAKE 1 TABLET BY MOUTH  TWO TIMES A DAY ONCE IN THE MORNING AND THEN LATE AFTERNOON 30 tablet 5    NON FORMULARY Klein's compound cream      potassium chloride CR 20 mEq ER tablet Take 1 tablet (20 mEq) by mouth once daily as needed (for muscle cramps only). Do not crush or chew. 30 tablet 3    Qvar RediHaler 80 mcg/actuation inhaler Inhale 2 Inhalations 2 times a day. Rinse mouth with water after use to reduce aftertaste and incidence of candidiasis. Do not swallow. 10.6 g 1    sildenafil (Viagra) 100 mg tablet Take 1 tablet (100 mg) by mouth once daily as needed (Take 30-60 minutes before sexual activity.). 10 tablet 1    triamcinolone (Kenalog) 0.025 % ointment APPLY TO AFFECTED AREA(S) TWO TIMES A DAY FOR 2 WEEKS 80 g 0    True Metrix Glucose Test Strip strip       Ventolin HFA 90 mcg/actuation inhaler Inhale 2 puffs every 4 hours if needed (cough). 18 g 1     No current facility-administered medications for this visit.          ROS:  The patient is awake and oriented. No dizziness or lightheadedness. No chills and no fever. No headaches. No nausea and no vomiting. No shortness of breath. No cough. No chest pain. No abdominal pain. No diarrhea. No hematemesis or hemoptysis. No hematuria. No rectal bleeding. No melena. No epistaxis. No urinary symptoms. No flank pain. No leg edema. No itching. Overall, the rest of the review of systems is also negative.  12 point review of systems otherwise negative as stated in HPI.        Physical Examination:        Vitals:    04/15/25 1356   BP: 138/85   Pulse: 78     General: The patient is awake, oriented, and is not in any distress.  Head and Neck: Normocephalic. No periorbital edema.  Eyes: non-icteric  Respiratory: Symmetric chest expansion. No respiratory distress.  Skin: No maculopapular rash.  Musculoskeletal: No peripheral edema.  Neuro Exam: Speech is fluent. Moves extremities.    Imaging:  === 03/30/23 ===    US RENAL COMPLETE    - Impression -  Unremarkable renal ultrasound.       Blood  Labs:  No results found for this or any previous visit (from the past 24 hours).   Lab Results   Component Value Date    PTH 38.4 10/19/2024    PROTUR NEGATIVE 04/22/2023    PROTUR 8 03/28/2023    PHOS 4.0 03/28/2023      Lab Results   Component Value Date    GLUCOSE 91 11/02/2024    CALCIUM 9.2 11/02/2024     11/02/2024    K 3.2 (L) 11/02/2024    CO2 33 (H) 11/02/2024     11/02/2024    BUN 16 11/02/2024    CREATININE 1.55 (H) 11/02/2024         Assessment and Plan:  1. Chronic kidney disease stage III. Last creatinine level from October 2024 is 1.55.  I asked for basic metabolic panel to be done today.  PTH and 25-hydroxy vitamin D level to be checked.     2. Hypertension. His blood pressure is  higher than the goal.  I reviewed his home blood pressure readings.  He has perfectly good blood pressure reading at home.    3.  Diabetes.  I asked for spot urine protein to creatinine ratio.  5        I will see him in about 6-month for follow-up              Shaun Jacobson MD  Senior Attending Physician  Director of Onco-Nephrology Program  Division of Nephrology & Hypertension  [facility]

## 2025-04-16 ASSESSMENT — ENCOUNTER SYMPTOMS
NECK PAIN: 0
PND: 0
SHORTNESS OF BREATH: 0
PALPITATIONS: 0
ORTHOPNEA: 0
HEADACHES: 0
BLURRED VISION: 0
HYPERTENSION: 1

## 2025-04-20 LAB
25(OH)D3+25(OH)D2 SERPL-MCNC: 33 NG/ML (ref 30–100)
ANION GAP SERPL CALCULATED.4IONS-SCNC: 13 MMOL/L (CALC) (ref 7–17)
BUN SERPL-MCNC: 15 MG/DL (ref 7–25)
BUN/CREAT SERPL: 10 (CALC) (ref 6–22)
CALCIUM SERPL-MCNC: 9.2 MG/DL (ref 8.6–10.3)
CHLORIDE SERPL-SCNC: 100 MMOL/L (ref 98–110)
CO2 SERPL-SCNC: 28 MMOL/L (ref 20–32)
CREAT SERPL-MCNC: 1.47 MG/DL (ref 0.7–1.3)
CREAT UR-MCNC: NORMAL MG/DL
EGFRCR SERPLBLD CKD-EPI 2021: 56 ML/MIN/1.73M2
GLUCOSE SERPL-MCNC: 87 MG/DL (ref 65–139)
POTASSIUM SERPL-SCNC: 3 MMOL/L (ref 3.5–5.3)
PROT UR-MCNC: NORMAL G/DL
PROT/CREAT UR: NORMAL MG/G{CREAT}
PROT/CREAT UR: NORMAL MG/G{CREAT}
PTH-INTACT SERPL-MCNC: 43 PG/ML (ref 16–77)
SODIUM SERPL-SCNC: 141 MMOL/L (ref 135–146)

## 2025-04-21 LAB
25(OH)D3+25(OH)D2 SERPL-MCNC: 33 NG/ML (ref 30–100)
ANION GAP SERPL CALCULATED.4IONS-SCNC: 13 MMOL/L (CALC) (ref 7–17)
BUN SERPL-MCNC: 15 MG/DL (ref 7–25)
BUN/CREAT SERPL: 10 (CALC) (ref 6–22)
CALCIUM SERPL-MCNC: 9.2 MG/DL (ref 8.6–10.3)
CHLORIDE SERPL-SCNC: 100 MMOL/L (ref 98–110)
CO2 SERPL-SCNC: 28 MMOL/L (ref 20–32)
CREAT SERPL-MCNC: 1.47 MG/DL (ref 0.7–1.3)
CREAT UR-MCNC: 245 MG/DL (ref 20–320)
EGFRCR SERPLBLD CKD-EPI 2021: 56 ML/MIN/1.73M2
GLUCOSE SERPL-MCNC: 87 MG/DL (ref 65–139)
POTASSIUM SERPL-SCNC: 3 MMOL/L (ref 3.5–5.3)
PROT UR-MCNC: 14 MG/DL (ref 5–25)
PROT/CREAT UR: 0.06 MG/MG CREAT (ref 0.03–0.15)
PROT/CREAT UR: 57 MG/G CREAT (ref 25–148)
PTH-INTACT SERPL-MCNC: 43 PG/ML (ref 16–77)
SODIUM SERPL-SCNC: 141 MMOL/L (ref 135–146)

## 2025-04-22 ENCOUNTER — TELEPHONE (OUTPATIENT)
Dept: NEPHROLOGY | Facility: CLINIC | Age: 55
End: 2025-04-22
Payer: COMMERCIAL

## 2025-04-22 NOTE — TELEPHONE ENCOUNTER
----- Message from Shaun Jacobson sent at 4/21/2025  3:24 PM EDT -----  Low K level. He needs to take his K supplement once a day.   ----- Message -----  From: [facility]  Sent: 4/20/2025   2:27 AM EDT  To: Shaun Jacobson MD

## 2025-04-23 ENCOUNTER — APPOINTMENT (OUTPATIENT)
Dept: PRIMARY CARE | Facility: CLINIC | Age: 55
End: 2025-04-23
Payer: COMMERCIAL

## 2025-04-23 VITALS
BODY MASS INDEX: 35.98 KG/M2 | OXYGEN SATURATION: 94 % | HEART RATE: 73 BPM | WEIGHT: 258 LBS | DIASTOLIC BLOOD PRESSURE: 74 MMHG | SYSTOLIC BLOOD PRESSURE: 130 MMHG | TEMPERATURE: 96.2 F

## 2025-04-23 DIAGNOSIS — N18.31 STAGE 3A CHRONIC KIDNEY DISEASE (MULTI): ICD-10-CM

## 2025-04-23 DIAGNOSIS — J45.20 MILD INTERMITTENT ASTHMA WITHOUT COMPLICATION (HHS-HCC): ICD-10-CM

## 2025-04-23 DIAGNOSIS — G47.33 OSA ON CPAP: Primary | ICD-10-CM

## 2025-04-23 DIAGNOSIS — J45.20 MILD INTERMITTENT ASTHMA, UNCOMPLICATED (HHS-HCC): ICD-10-CM

## 2025-04-23 DIAGNOSIS — E11.9 TYPE 2 DIABETES MELLITUS WITHOUT COMPLICATION, WITHOUT LONG-TERM CURRENT USE OF INSULIN: ICD-10-CM

## 2025-04-23 PROBLEM — M72.2 PLANTAR FASCIITIS: Status: ACTIVE | Noted: 2025-04-23

## 2025-04-23 PROBLEM — S29.019A STRAIN OF MUSCLE OF TORSO: Status: ACTIVE | Noted: 2025-04-23

## 2025-04-23 PROBLEM — R81 GLYCOSURIA: Status: ACTIVE | Noted: 2025-04-23

## 2025-04-23 LAB — POC HEMOGLOBIN A1C: 6.4 % (ref 4.2–6.5)

## 2025-04-23 PROCEDURE — 3075F SYST BP GE 130 - 139MM HG: CPT | Performed by: NURSE PRACTITIONER

## 2025-04-23 PROCEDURE — 83036 HEMOGLOBIN GLYCOSYLATED A1C: CPT | Performed by: NURSE PRACTITIONER

## 2025-04-23 PROCEDURE — 4010F ACE/ARB THERAPY RXD/TAKEN: CPT | Performed by: NURSE PRACTITIONER

## 2025-04-23 PROCEDURE — 3044F HG A1C LEVEL LT 7.0%: CPT | Performed by: NURSE PRACTITIONER

## 2025-04-23 PROCEDURE — 1036F TOBACCO NON-USER: CPT | Performed by: NURSE PRACTITIONER

## 2025-04-23 PROCEDURE — 3078F DIAST BP <80 MM HG: CPT | Performed by: NURSE PRACTITIONER

## 2025-04-23 PROCEDURE — 99214 OFFICE O/P EST MOD 30 MIN: CPT | Performed by: NURSE PRACTITIONER

## 2025-04-23 RX ORDER — METFORMIN HYDROCHLORIDE 500 MG/1
500 TABLET, EXTENDED RELEASE ORAL
Qty: 30 TABLET | Refills: 2 | Status: SHIPPED | OUTPATIENT
Start: 2025-04-23

## 2025-04-23 RX ORDER — GABAPENTIN 800 MG/1
800 TABLET ORAL DAILY
COMMUNITY
Start: 2025-03-24

## 2025-04-23 ASSESSMENT — ENCOUNTER SYMPTOMS
PND: 0
SHORTNESS OF BREATH: 0
NECK PAIN: 0
BLURRED VISION: 0
ORTHOPNEA: 0
HEADACHES: 0
PALPITATIONS: 0
HYPERTENSION: 1

## 2025-04-23 ASSESSMENT — PAIN SCALES - GENERAL: PAINLEVEL_OUTOF10: 0-NO PAIN

## 2025-04-23 NOTE — PROGRESS NOTES
Subjective   Patient ID: Hadley Carrasco is a 54 y.o. male who presents for Follow-up.        Hypertension  This is a recurrent problem. The current episode started more than 1 year ago. The problem has been gradually improving since onset. The problem is controlled. Pertinent negatives include no anxiety, blurred vision, chest pain, headaches, malaise/fatigue, neck pain, orthopnea, palpitations, peripheral edema, PND or shortness of breath. There are no associated agents to hypertension. Risk factors for coronary artery disease include diabetes mellitus. There are no compliance problems.       DM2- well controlled. HGA1C today  Eating well, exercising and has added more Cardio.     Noted new rehan orbital dark circles as well as darken skin to bridge of nose where his Cpap mask comes in contact with skin. Suggest padding to mask, Derm eval    Asthma- well controlled      Review of Systems   Constitutional:  Negative for malaise/fatigue.   Eyes:  Negative for blurred vision.   Respiratory:  Negative for shortness of breath.    Cardiovascular:  Negative for chest pain, palpitations, orthopnea and PND.   Musculoskeletal:  Negative for neck pain.   Neurological:  Negative for headaches.       Objective   /74 (BP Location: Left arm, Patient Position: Sitting)   Pulse 73   Temp 35.7 °C (96.2 °F) (Temporal)   Wt 117 kg (258 lb)   SpO2 94%   BMI 35.98 kg/m²     Physical Exam  Vitals reviewed.   Constitutional:       Appearance: Normal appearance.   Cardiovascular:      Rate and Rhythm: Normal rate and regular rhythm.   Pulmonary:      Effort: Pulmonary effort is normal.      Breath sounds: Normal breath sounds.   Musculoskeletal:      Cervical back: Normal range of motion and neck supple.   Skin:     General: Skin is warm.      Findings: No bruising or erythema.   Neurological:      General: No focal deficit present.      Mental Status: He is alert.   Psychiatric:         Mood and Affect: Mood normal.          Assessment/Plan   Problem List Items Addressed This Visit           ICD-10-CM    Type 2 diabetes mellitus without complication, without long-term current use of insulin E11.9    4/2025  HGA1C 6.4  Well controlled          Relevant Medications    metFORMIN  mg 24 hr tablet    Other Relevant Orders    POCT glycosylated hemoglobin (Hb A1C) manually resulted (Completed)    Referral to Dermatology    Mild intermittent asthma, uncomplicated ([facility]) J45.20    Controlled         LESA on CPAP - Primary G47.33    Has new mask, would like study>5 years         Relevant Orders    Referral to Adult Sleep Medicine    Stage 3a chronic kidney disease (Multi) N18.31    Following with nephrology, Dr Jacobson         Asthma J45.909

## 2025-04-23 NOTE — PROGRESS NOTES
Answers submitted by the patient for this visit:  High Blood Pressure Questionnaire (Submitted on 4/16/2025)  Chief Complaint: Hypertension  Chronicity: recurrent  Onset: more than 1 year ago  Progression since onset: gradually improving  Condition status: controlled  anxiety: No  blurred vision: No  chest pain: No  headaches: No  malaise/fatigue: No  neck pain: No  orthopnea: No  palpitations: No  peripheral edema: No  PND: No  shortness of breath: No  Agents associated with hypertension: no associated agents  CAD risks: diabetes mellitus  Compliance problems: no compliance problems

## 2025-05-26 DIAGNOSIS — E11.9 TYPE 2 DIABETES MELLITUS WITHOUT COMPLICATION, WITHOUT LONG-TERM CURRENT USE OF INSULIN: ICD-10-CM

## 2025-05-27 RX ORDER — BLOOD-GLUCOSE SENSOR
EACH MISCELLANEOUS
Qty: 1 EACH | Refills: 11 | Status: SHIPPED | OUTPATIENT
Start: 2025-05-27

## 2025-05-27 RX ORDER — METFORMIN HYDROCHLORIDE 500 MG/1
TABLET, EXTENDED RELEASE ORAL
Qty: 30 TABLET | Refills: 2 | Status: SHIPPED | OUTPATIENT
Start: 2025-05-27

## 2025-05-31 ENCOUNTER — APPOINTMENT (OUTPATIENT)
Dept: CARDIOLOGY | Facility: HOSPITAL | Age: 55
End: 2025-05-31
Payer: COMMERCIAL

## 2025-05-31 ENCOUNTER — HOSPITAL ENCOUNTER (EMERGENCY)
Facility: HOSPITAL | Age: 55
Discharge: HOME | End: 2025-05-31
Attending: STUDENT IN AN ORGANIZED HEALTH CARE EDUCATION/TRAINING PROGRAM
Payer: COMMERCIAL

## 2025-05-31 ENCOUNTER — APPOINTMENT (OUTPATIENT)
Dept: RADIOLOGY | Facility: HOSPITAL | Age: 55
End: 2025-05-31
Payer: COMMERCIAL

## 2025-05-31 VITALS
WEIGHT: 255 LBS | HEIGHT: 71 IN | RESPIRATION RATE: 19 BRPM | OXYGEN SATURATION: 99 % | BODY MASS INDEX: 35.7 KG/M2 | SYSTOLIC BLOOD PRESSURE: 136 MMHG | DIASTOLIC BLOOD PRESSURE: 79 MMHG | TEMPERATURE: 98.1 F | HEART RATE: 58 BPM

## 2025-05-31 DIAGNOSIS — R07.9 CHEST PAIN, UNSPECIFIED TYPE: Primary | ICD-10-CM

## 2025-05-31 LAB
ALBUMIN SERPL BCP-MCNC: 4.2 G/DL (ref 3.4–5)
ALP SERPL-CCNC: 66 U/L (ref 33–120)
ALT SERPL W P-5'-P-CCNC: 19 U/L (ref 10–52)
ANION GAP SERPL CALC-SCNC: 12 MMOL/L (ref 10–20)
AST SERPL W P-5'-P-CCNC: 20 U/L (ref 9–39)
BASOPHILS # BLD AUTO: 0.01 X10*3/UL (ref 0–0.1)
BASOPHILS NFR BLD AUTO: 0.2 %
BILIRUB SERPL-MCNC: 0.9 MG/DL (ref 0–1.2)
BUN SERPL-MCNC: 16 MG/DL (ref 6–23)
CALCIUM SERPL-MCNC: 9.2 MG/DL (ref 8.6–10.3)
CARDIAC TROPONIN I PNL SERPL HS: 10 NG/L (ref 0–20)
CHLORIDE SERPL-SCNC: 100 MMOL/L (ref 98–107)
CO2 SERPL-SCNC: 32 MMOL/L (ref 21–32)
CREAT SERPL-MCNC: 1.57 MG/DL (ref 0.5–1.3)
EGFRCR SERPLBLD CKD-EPI 2021: 52 ML/MIN/1.73M*2
EOSINOPHIL # BLD AUTO: 0.16 X10*3/UL (ref 0–0.7)
EOSINOPHIL NFR BLD AUTO: 2.8 %
ERYTHROCYTE [DISTWIDTH] IN BLOOD BY AUTOMATED COUNT: 15.9 % (ref 11.5–14.5)
GLUCOSE SERPL-MCNC: 83 MG/DL (ref 74–99)
HCT VFR BLD AUTO: 48.7 % (ref 41–52)
HGB BLD-MCNC: 15.1 G/DL (ref 13.5–17.5)
IMM GRANULOCYTES # BLD AUTO: 0.02 X10*3/UL (ref 0–0.7)
IMM GRANULOCYTES NFR BLD AUTO: 0.3 % (ref 0–0.9)
LYMPHOCYTES # BLD AUTO: 2.81 X10*3/UL (ref 1.2–4.8)
LYMPHOCYTES NFR BLD AUTO: 49 %
MCH RBC QN AUTO: 24.9 PG (ref 26–34)
MCHC RBC AUTO-ENTMCNC: 31 G/DL (ref 32–36)
MCV RBC AUTO: 80 FL (ref 80–100)
MONOCYTES # BLD AUTO: 0.46 X10*3/UL (ref 0.1–1)
MONOCYTES NFR BLD AUTO: 8 %
NEUTROPHILS # BLD AUTO: 2.28 X10*3/UL (ref 1.2–7.7)
NEUTROPHILS NFR BLD AUTO: 39.7 %
NRBC BLD-RTO: 0 /100 WBCS (ref 0–0)
PLATELET # BLD AUTO: 195 X10*3/UL (ref 150–450)
POTASSIUM SERPL-SCNC: 3.4 MMOL/L (ref 3.5–5.3)
PROT SERPL-MCNC: 7.6 G/DL (ref 6.4–8.2)
RBC # BLD AUTO: 6.07 X10*6/UL (ref 4.5–5.9)
SODIUM SERPL-SCNC: 141 MMOL/L (ref 136–145)
WBC # BLD AUTO: 5.7 X10*3/UL (ref 4.4–11.3)

## 2025-05-31 PROCEDURE — 85025 COMPLETE CBC W/AUTO DIFF WBC: CPT

## 2025-05-31 PROCEDURE — 71046 X-RAY EXAM CHEST 2 VIEWS: CPT | Performed by: RADIOLOGY

## 2025-05-31 PROCEDURE — 71046 X-RAY EXAM CHEST 2 VIEWS: CPT

## 2025-05-31 PROCEDURE — 93005 ELECTROCARDIOGRAM TRACING: CPT

## 2025-05-31 PROCEDURE — 80053 COMPREHEN METABOLIC PANEL: CPT

## 2025-05-31 PROCEDURE — 84484 ASSAY OF TROPONIN QUANT: CPT

## 2025-05-31 PROCEDURE — 99285 EMERGENCY DEPT VISIT HI MDM: CPT | Mod: 25 | Performed by: STUDENT IN AN ORGANIZED HEALTH CARE EDUCATION/TRAINING PROGRAM

## 2025-05-31 PROCEDURE — 36415 COLL VENOUS BLD VENIPUNCTURE: CPT

## 2025-05-31 PROCEDURE — 2500000001 HC RX 250 WO HCPCS SELF ADMINISTERED DRUGS (ALT 637 FOR MEDICARE OP): Performed by: STUDENT IN AN ORGANIZED HEALTH CARE EDUCATION/TRAINING PROGRAM

## 2025-05-31 PROCEDURE — 2500000005 HC RX 250 GENERAL PHARMACY W/O HCPCS

## 2025-05-31 RX ORDER — POTASSIUM CHLORIDE 1.5 G/1.58G
40 POWDER, FOR SOLUTION ORAL ONCE
Status: COMPLETED | OUTPATIENT
Start: 2025-05-31 | End: 2025-05-31

## 2025-05-31 RX ORDER — LIDOCAINE 50 MG/G
1 PATCH TOPICAL DAILY
Qty: 5 PATCH | Refills: 0 | Status: SHIPPED | OUTPATIENT
Start: 2025-05-31 | End: 2025-06-05

## 2025-05-31 RX ORDER — LIDOCAINE 560 MG/1
1 PATCH PERCUTANEOUS; TOPICAL; TRANSDERMAL ONCE
Status: DISCONTINUED | OUTPATIENT
Start: 2025-05-31 | End: 2025-05-31 | Stop reason: HOSPADM

## 2025-05-31 RX ADMIN — LIDOCAINE 4% 1 PATCH: 40 PATCH TOPICAL at 16:40

## 2025-05-31 RX ADMIN — POTASSIUM CHLORIDE 40 MEQ: 1.5 POWDER, FOR SOLUTION ORAL at 16:40

## 2025-05-31 ASSESSMENT — PAIN SCALES - GENERAL: PAINLEVEL_OUTOF10: 6

## 2025-05-31 ASSESSMENT — COLUMBIA-SUICIDE SEVERITY RATING SCALE - C-SSRS
2. HAVE YOU ACTUALLY HAD ANY THOUGHTS OF KILLING YOURSELF?: NO
1. IN THE PAST MONTH, HAVE YOU WISHED YOU WERE DEAD OR WISHED YOU COULD GO TO SLEEP AND NOT WAKE UP?: NO
6. HAVE YOU EVER DONE ANYTHING, STARTED TO DO ANYTHING, OR PREPARED TO DO ANYTHING TO END YOUR LIFE?: NO

## 2025-05-31 ASSESSMENT — PAIN - FUNCTIONAL ASSESSMENT: PAIN_FUNCTIONAL_ASSESSMENT: 0-10

## 2025-05-31 ASSESSMENT — PAIN DESCRIPTION - LOCATION: LOCATION: CHEST

## 2025-05-31 ASSESSMENT — PAIN DESCRIPTION - PAIN TYPE: TYPE: ACUTE PAIN

## 2025-05-31 ASSESSMENT — PAIN DESCRIPTION - ORIENTATION: ORIENTATION: MID;LOWER

## 2025-05-31 NOTE — ED TRIAGE NOTES
Pt coming in for chest pain that started around 2 days ago. States that its in the middle of his chest. Hasn't gotten improved but hasn't gotten any worse. States that he took some tylenol yesterday but didn't get any relief so he wanted to be safe and get checked out. Denies any cardiac issues in the past.

## 2025-05-31 NOTE — DISCHARGE INSTRUCTIONS
You were seen in the ER for chest pain. You should follow up with your primary care doctor about your chest pain.  At home, you can take Tylenol for pain every 6 hours and use a lidocaine patch for 12 hours once a day.  Do not take more than 4000 mg of Tylenol in a day. Please return if you develop worsening chest pain, shortness of breath, or if you feel you need to be re-evaluated.

## 2025-05-31 NOTE — ED PROVIDER NOTES
Emergency Department Provider Note        History of Present Illness     History provided by: Patient  Limitations to History: None  External Records Reviewed with Brief Summary: Outpatient progress note from 2025 which showed outpatient primary care visit for follow-up.  For medical history    HPI:  Hadley Carrasco is a 54 y.o. male with PMHx T2DM, asthma, LESA, CKD presents to ED for chest pain.  Patient reports chest pain started 2 days ago has been constant since then.  Minimal relief with Tylenol, not noted any exacerbating factors.  No cardiac history, no history of MI, does have history of hypertension.  Reports pain is currently 5/10 in intensity.  No fever/chills, cough, congestion, shortness of breath.  No recent travel or surgery.    Physical Exam   Triage vitals:  T 36.7 °C (98.1 °F)  HR 57  /79  RR 16  O2 97 % None (Room air)    Triage vitals reviewed.  Constitutional: Well developed adult in no acute distress, non toxic in appearance  Head: Normocephalic, atraumatic  Skin: Intact. No rashes or lesions.  Eyes: No conjunctival injection, scleral icterus, or drainage.  Cardiac: Normal rate, regular rhythm.  No murmur/gallop/rubs appreciated.  Chest pain reproducible with palpation of the sternum.  Pulmonary: Breath sounds clear to auscultation bilaterally. Normal work of breathing without signs of respiratory distress.  Extremities: No gross deformities. Moving all extremities spontaneously without difficulty.   Neuro: Awake and alert. Face is symmetric.  Speech is clear.     Medical Decision Making & ED Course   Medical Decision Makin y.o. male with PMHx T2DM, asthma, LESA, CKD presenting to ED for chest pain.  He was afebrile and hemodynamically stable on arrival, in no acute distress.  Differential diagnosis includes musculoskeletal pain, MI, unstable angina, pneumonia, asthma exacerbation.  EKG showed sinus bradycardia without evidence of ischemia, troponin within normal limits.   Delta troponin not indicated given duration of pain.  CBC unremarkable, CMP with renal function at patient's baseline, notable for mild hypokalemia, for which patient was given oral potassium.  CXR showed no evidence of pneumonia or pneumothorax.    Discussed the likely diagnosis of musculoskeletal pain with the patient who expressed understanding.  I recommended that he continue Tylenol every 6 hours for pain as needed and add lidocaine patches.  Recommended avoiding NSAIDs in the setting of his CKD.  Advised him to follow-up with his primary care provider regarding his chest pain and potassium and patient was provided with information on high potassium foods to add to his diet.  Reviewed return precautions including chest pain, shortness of breath, or new concerning symptoms.  Discharged from the emergency department in stable condition.    ----    Differential diagnoses considered include but are not limited to: Musculoskeletal pain, MI, unstable angina, pneumonia, asthma exacerbation, pneumothorax     Social Determinants of Health which Significantly Impact Care: None identified     EKG Independent Interpretation: EKG interpreted by myself. Please see ED Course for full interpretation.    Independent Result Review and Interpretation: Relevant laboratory and radiographic results were reviewed and independently interpreted by myself.  As necessary, they are commented on in the ED Course.    Chronic conditions affecting the patient's care: As documented above in [facility]    The patient was discussed with the following consultants/services: None    Care Considerations: As documented above in [facility]    ED Course:  ED Course as of 05/31/25 1723   Sat May 31, 2025   1530 Interpreted by the Emergency Department Attending: ECG revealed sinus bradycardia at a rate of 59 beats per minute with VA interval 176 , QRS of 92 , QTc of 405.  No acute injury pattern.  No previous EKG to compare. [MG]   1542 XR chest 2 views  CXR  independently reviewed without evidence of pneumothorax or consolidation, no widened mediastinum, no obvious pulmonary edema or pleural effusion [HH]      ED Course User Index  [HH] Migdalia Ramos MD  [MG] George Sutton DO         Diagnoses as of 05/31/25 1723   Chest pain, unspecified type     Disposition   As a result of the work-up, the patient was discharged home.  he was informed of his diagnosis and instructed to come back with any concerns or worsening of condition.  he and was agreeable to the plan as discussed above.  he was given the opportunity to ask questions.  All of the patient's questions were answered.    Patient seen and discussed with ED attending physician.    Migdalia Ramos MD  Emergency Medicine     Migdalia Ramos MD  Resident  05/31/25 1723    I did evaluate the patient independently from the resident and was present for key medical decision making.  Agree with disposition.  Any discrepancies between residents findings are detailed below.    This is a 54-year-old male presenting to the emergency department for centralized chest pain.  Patient reports that it started the night after performing a heavy workout at the gym.  Pain is highly reproducible.  He reports the pain is 5 out of 10 nonradiating.  Denies any history of cardiac problems.  Did try taking Tylenol prior to arrival with moderate relief.  Denies any further associated symptoms at this time.    VS: As documented in the triage note from today's date and EMR flowsheet were reviewed.  Gen: Well developed. No acute distress. Seated in bed. Appears nontoxic.   Skin: Warm. Dry. Intact. No rashes or lesions.  Eyes: Pupils equally round and reactive to light. Clear sclera.   HENT: Atraumatic appearance. Mucosal membranes moist. No oral lesions, uvula midline, airway patent.   CV: Regular rate and regular rhythm. S1, S2. No pedal edema. Warm extremities.  Resp: Nonlabored breathing Clear to auscultation bilaterally. No increased  work of breathing.   GI: Soft and nontender. No rebound or guarding. Bowel sounds x4 present.   MSK: Symmetric muscle bulk. No joint swelling in the extremities. Compartments are soft. Neurovascularly intact x4 extremities. Radial pulses +2 equal bilaterally.  Pedal pulses +2 equal bilaterally.  Highly reproducible chest wall tenderness at the costal sternal junction.  Neuro: Alert. Speech fluent. Moving all extremities. No focal deficits. Gait normal.  Psych: Appropriate. Kempt.    Patient arrives hypertensive recommend follow-up with primary physician for repeat checks.  He is found to be mildly hypokalemic is given oral potassium for supplementation no signs of anemia no leukocytosis make infectious etiology less likely.  Cardiac troponin within normal range no acute injury pattern on EKG lower concern for ACS at this time based off of history presentation and workup.  There is no indication for repeat cardiac troponin at this time as symptoms have been ongoing for the past 2 days without change.  Patient feeling somewhat improved after the lidocaine patch.  He is recommend Tylenol as needed lidocaine patches 12 hours on 12 hours off close follow-up with his primary physician.  Appreciative of care agreeable with plan discharged in stable condition    HEART Score:    History:   [ x  ] Slightly suspicious - 0   [  ] Moderately suspicious - 1  [  ] Highly suspicious - 2     EKG:   [ x ] Normal - 0    [  ] Non-specific repolarization disturbance (No ST changes, but LBBB, LVH, repolarization changes)  - 1   [  ] Significant ST deviation (ST changes not d/t LBBB, LVH, digoxin)  - 2     Age:   [  ] < 45 - 0   [ x ] 45-64 - 1   [  ] > 65 - 2     Risk Factors:     HTN, HLD, DM, obesity (BMI > 30), smoking (current or w/I 3mo), + fmx (before age of 65), CAD, prior MI, PCI/CABG, CVA/TIA, PAD  [  ] No known risk factors - 0   [  ] 1-2 risk factors - 1    [ x ] >3 risk factors or hx of atherosclerotic disease - 2     Initial  troponin:  [ x ] < normal limit - 0   [  ] 1 - 3x normal limit - 1   [  ] > 3x normal limit - 2    Total:   [x] 0-3 Points: 1.7% risk of major adverse cardiac event in 6 weeks  [ ] 4-6 Points: 12-16.6% risk of major adverse cardiac event in 6 weeks  [ ] 7-10 Points 50-65% risk of major adverse cardiac event in 6 weeks    I did discuss the heart score results with the patient.  We did discuss the risk stratification. I did discuss that the patient's risk based on the above scoring and their risk of coronary artery disease. The patient is comfortable being discharged home and following up with the appropriate physicians. This is shared decision making. They're to return to the nearest emergency room for any new or worsening symptoms.    Information provided by the patient  Past medical history complicating hypertension, diabetes  Previous medical records reviewed office visit 4/23/2025  Considered CTA of the chest although clinically low concern for PE patient not tachycardic nor is he hypoxic  Shared medical decision making patient agreeable with close outpatient follow-up.  Strict return precautions.    RADIOLOGY:   Non-plain film images such as CT, Ultrasound and MRI are read by the radiologist. Plain radiographic images are visualized and preliminarily interpreted by the emergency physician with the below findings: Chest x-ray no widened mediastinum or pneumonia.      Interpretation per the Radiologist below, if available at the time of this note:    XR chest 2 views   Final Result   No radiographic evidence of an acute cardiopulmonary process.        MACRO:   None.        Signed by: Oliver Brownlee 5/31/2025 4:05 PM   Dictation workstation:   [id number]          DO George Chatman DO  05/31/25 6111

## 2025-06-03 LAB
ATRIAL RATE: 59 BPM
P AXIS: 98 DEGREES
P OFFSET: 152 MS
P ONSET: 119 MS
PR INTERVAL: 176 MS
Q ONSET: 207 MS
QRS COUNT: 10 BEATS
QRS DURATION: 92 MS
QT INTERVAL: 410 MS
QTC CALCULATION(BAZETT): 405 MS
QTC FREDERICIA: 407 MS
R AXIS: -26 DEGREES
T AXIS: -7 DEGREES
T OFFSET: 412 MS
VENTRICULAR RATE: 59 BPM

## 2025-06-26 ENCOUNTER — APPOINTMENT (OUTPATIENT)
Dept: PRIMARY CARE | Facility: CLINIC | Age: 55
End: 2025-06-26
Payer: COMMERCIAL

## 2025-07-11 ENCOUNTER — APPOINTMENT (OUTPATIENT)
Dept: OPHTHALMOLOGY | Facility: CLINIC | Age: 55
End: 2025-07-11
Payer: COMMERCIAL

## 2025-07-11 DIAGNOSIS — E11.9 TYPE 2 DIABETES MELLITUS WITHOUT COMPLICATION, WITHOUT LONG-TERM CURRENT USE OF INSULIN: ICD-10-CM

## 2025-07-11 DIAGNOSIS — H35.371 EPIRETINAL MEMBRANE (ERM) OF RIGHT EYE: Primary | ICD-10-CM

## 2025-07-11 PROCEDURE — 92134 CPTRZ OPH DX IMG PST SGM RTA: CPT | Performed by: OPTOMETRIST

## 2025-07-11 PROCEDURE — 99213 OFFICE O/P EST LOW 20 MIN: CPT | Performed by: OPTOMETRIST

## 2025-07-11 PROCEDURE — 2023F DILAT RTA XM W/O RTNOPTHY: CPT | Performed by: OPTOMETRIST

## 2025-07-11 ASSESSMENT — VISUAL ACUITY
CORRECTION_TYPE: GLASSES
OS_CC: 20/20
OD_CC+: -1
OD_PH_CC: 20/30
OD_CC: 20/50
OD_PH_CC+: +2
OS_CC+: -1
METHOD: SNELLEN - LINEAR

## 2025-07-11 ASSESSMENT — REFRACTION_MANIFEST
OD_CYLINDER: -0.25
OD_AXIS: 120
OS_CYLINDER: -0.75
OS_SPHERE: -2.00
OS_AXIS: 071
OD_SPHERE: -2.50
OS_ADD: +2.25
OD_ADD: +2.25

## 2025-07-11 ASSESSMENT — ENCOUNTER SYMPTOMS
ALLERGIC/IMMUNOLOGIC NEGATIVE: 0
NEUROLOGICAL NEGATIVE: 0
MUSCULOSKELETAL NEGATIVE: 0
CONSTITUTIONAL NEGATIVE: 0
CARDIOVASCULAR NEGATIVE: 0
GASTROINTESTINAL NEGATIVE: 0
RESPIRATORY NEGATIVE: 0
PSYCHIATRIC NEGATIVE: 0
HEMATOLOGIC/LYMPHATIC NEGATIVE: 0
EYES NEGATIVE: 1
ENDOCRINE NEGATIVE: 0

## 2025-07-11 ASSESSMENT — EXTERNAL EXAM - RIGHT EYE: OD_EXAM: NORMAL

## 2025-07-11 ASSESSMENT — TONOMETRY
OD_IOP_MMHG: 18
OS_IOP_MMHG: 18
IOP_METHOD: GOLDMANN APPLANATION

## 2025-07-11 ASSESSMENT — REFRACTION_WEARINGRX
OS_CYLINDER: -0.75
OD_CYLINDER: -0.50
OS_AXIS: 071
OD_SPHERE: -1.50
SPECS_TYPE: SVL
OS_SPHERE: -1.75
OD_AXIS: 127

## 2025-07-11 ASSESSMENT — SLIT LAMP EXAM - LIDS
COMMENTS: NORMAL
COMMENTS: NORMAL

## 2025-07-11 ASSESSMENT — CONF VISUAL FIELD
OS_INFERIOR_TEMPORAL_RESTRICTION: 0
OD_INFERIOR_TEMPORAL_RESTRICTION: 0
OD_INFERIOR_NASAL_RESTRICTION: 0
OD_NORMAL: 1
OS_NORMAL: 1
METHOD: COUNTING FINGERS
OD_SUPERIOR_NASAL_RESTRICTION: 0
OS_SUPERIOR_NASAL_RESTRICTION: 0
OS_SUPERIOR_TEMPORAL_RESTRICTION: 0
OD_SUPERIOR_TEMPORAL_RESTRICTION: 0
OS_INFERIOR_NASAL_RESTRICTION: 0

## 2025-07-11 ASSESSMENT — EXTERNAL EXAM - LEFT EYE: OS_EXAM: NORMAL

## 2025-07-11 ASSESSMENT — CUP TO DISC RATIO
OS_RATIO: 0.3
OD_RATIO: 0.3

## 2025-07-11 NOTE — PROGRESS NOTES
Assessment/Plan   Diagnoses and all orders for this visit:  Epiretinal membrane (ERM) of right eye  -     OCT, Retina - OU - Both Eyes  Discussed findings and etiology. ERM not visually significant at this time. Amsler grid for at home monitoring. RTC 6 months OCT Macula (21 line raster) or sooner with changes on grid. Given myopic, ok to view amsler grid without correction (SC).     Type 2 diabetes mellitus without complication, without long-term current use of insulin  The patient has diabetes without any evidence of retinopathy.  The patient was advised to maintain tight glucose control, tight blood pressure control, and favorable levels of cholesterol, low density lipoprotein, and high density lipoproteins.  Follow up in one year was recommended.      Next visit: full exam w/ OCT Macula and dfe.

## 2025-07-27 DIAGNOSIS — E11.9 TYPE 2 DIABETES MELLITUS WITHOUT COMPLICATION, WITHOUT LONG-TERM CURRENT USE OF INSULIN: ICD-10-CM

## 2025-07-28 DIAGNOSIS — E11.9 TYPE 2 DIABETES MELLITUS WITHOUT COMPLICATION, WITHOUT LONG-TERM CURRENT USE OF INSULIN: ICD-10-CM

## 2025-07-29 RX ORDER — METFORMIN HYDROCHLORIDE 500 MG/1
TABLET, EXTENDED RELEASE ORAL
Qty: 30 TABLET | Refills: 2 | Status: SHIPPED | OUTPATIENT
Start: 2025-07-29

## 2025-07-29 RX ORDER — DULAGLUTIDE 0.75 MG/.5ML
INJECTION, SOLUTION SUBCUTANEOUS
Qty: 2 ML | Refills: 1 | Status: SHIPPED | OUTPATIENT
Start: 2025-07-29

## 2025-08-05 ENCOUNTER — TELEPHONE (OUTPATIENT)
Dept: GASTROENTEROLOGY | Facility: CLINIC | Age: 55
End: 2025-08-05
Payer: COMMERCIAL

## 2025-08-05 DIAGNOSIS — K76.0 FATTY LIVER: Primary | ICD-10-CM

## 2025-08-10 DIAGNOSIS — N18.31 STAGE 3A CHRONIC KIDNEY DISEASE (MULTI): ICD-10-CM

## 2025-08-11 ENCOUNTER — CLINICAL SUPPORT (OUTPATIENT)
Dept: GASTROENTEROLOGY | Facility: HOSPITAL | Age: 55
End: 2025-08-11
Payer: COMMERCIAL

## 2025-08-11 DIAGNOSIS — K76.0 FATTY LIVER: ICD-10-CM

## 2025-08-11 PROCEDURE — 91200 LIVER ELASTOGRAPHY: CPT | Performed by: STUDENT IN AN ORGANIZED HEALTH CARE EDUCATION/TRAINING PROGRAM

## 2025-08-12 RX ORDER — DAPAGLIFLOZIN 10 MG/1
10 TABLET, FILM COATED ORAL DAILY
Qty: 30 TABLET | Refills: 0 | Status: SHIPPED | OUTPATIENT
Start: 2025-08-12

## 2025-08-28 DIAGNOSIS — I10 PRIMARY HYPERTENSION: ICD-10-CM

## 2025-09-02 RX ORDER — LOSARTAN POTASSIUM 50 MG/1
50 TABLET ORAL DAILY
Qty: 90 TABLET | Refills: 0 | Status: SHIPPED | OUTPATIENT
Start: 2025-09-02

## 2025-09-04 DIAGNOSIS — E11.9 TYPE 2 DIABETES MELLITUS WITHOUT COMPLICATION, WITHOUT LONG-TERM CURRENT USE OF INSULIN: ICD-10-CM

## 2025-09-04 RX ORDER — BLOOD-GLUCOSE SENSOR
EACH MISCELLANEOUS
Qty: 1 EACH | Refills: 11 | Status: SHIPPED | OUTPATIENT
Start: 2025-09-04

## 2025-09-10 ENCOUNTER — APPOINTMENT (OUTPATIENT)
Dept: PRIMARY CARE | Facility: CLINIC | Age: 55
End: 2025-09-10
Payer: COMMERCIAL

## 2025-09-30 ENCOUNTER — APPOINTMENT (OUTPATIENT)
Dept: SLEEP MEDICINE | Facility: CLINIC | Age: 55
End: 2025-09-30
Payer: COMMERCIAL

## 2025-10-14 ENCOUNTER — APPOINTMENT (OUTPATIENT)
Dept: NEPHROLOGY | Facility: CLINIC | Age: 55
End: 2025-10-14
Payer: COMMERCIAL

## 2026-01-15 ENCOUNTER — APPOINTMENT (OUTPATIENT)
Dept: OPHTHALMOLOGY | Facility: CLINIC | Age: 56
End: 2026-01-15
Payer: COMMERCIAL